# Patient Record
Sex: FEMALE | Race: WHITE | NOT HISPANIC OR LATINO | URBAN - METROPOLITAN AREA
[De-identification: names, ages, dates, MRNs, and addresses within clinical notes are randomized per-mention and may not be internally consistent; named-entity substitution may affect disease eponyms.]

---

## 2017-07-15 ENCOUNTER — EMERGENCY (EMERGENCY)
Facility: HOSPITAL | Age: 67
LOS: 1 days | Discharge: HOME | End: 2017-07-15
Attending: EMERGENCY MEDICINE
Payer: MEDICARE

## 2017-07-15 VITALS
TEMPERATURE: 98 F | HEART RATE: 81 BPM | RESPIRATION RATE: 18 BRPM | DIASTOLIC BLOOD PRESSURE: 76 MMHG | OXYGEN SATURATION: 97 % | SYSTOLIC BLOOD PRESSURE: 129 MMHG

## 2017-07-15 VITALS
OXYGEN SATURATION: 95 % | SYSTOLIC BLOOD PRESSURE: 109 MMHG | TEMPERATURE: 98 F | HEART RATE: 66 BPM | DIASTOLIC BLOOD PRESSURE: 73 MMHG | RESPIRATION RATE: 18 BRPM

## 2017-07-15 DIAGNOSIS — Z90.49 ACQUIRED ABSENCE OF OTHER SPECIFIED PARTS OF DIGESTIVE TRACT: Chronic | ICD-10-CM

## 2017-07-15 LAB
ALBUMIN SERPL ELPH-MCNC: 4 G/DL — SIGNIFICANT CHANGE UP (ref 3.3–5)
ALP SERPL-CCNC: 97 U/L — SIGNIFICANT CHANGE UP (ref 40–120)
ALT FLD-CCNC: 30 U/L RC — SIGNIFICANT CHANGE UP (ref 10–45)
ANION GAP SERPL CALC-SCNC: 19 MMOL/L — HIGH (ref 5–17)
APPEARANCE UR: CLEAR — SIGNIFICANT CHANGE UP
AST SERPL-CCNC: 37 U/L — SIGNIFICANT CHANGE UP (ref 10–40)
BASOPHILS # BLD AUTO: 0 K/UL — SIGNIFICANT CHANGE UP (ref 0–0.2)
BASOPHILS NFR BLD AUTO: 0.3 % — SIGNIFICANT CHANGE UP (ref 0–2)
BILIRUB SERPL-MCNC: 0.5 MG/DL — SIGNIFICANT CHANGE UP (ref 0.2–1.2)
BILIRUB UR-MCNC: NEGATIVE — SIGNIFICANT CHANGE UP
BUN SERPL-MCNC: 19 MG/DL — SIGNIFICANT CHANGE UP (ref 7–23)
CALCIUM SERPL-MCNC: 9.4 MG/DL — SIGNIFICANT CHANGE UP (ref 8.4–10.5)
CHLORIDE SERPL-SCNC: 98 MMOL/L — SIGNIFICANT CHANGE UP (ref 96–108)
CO2 SERPL-SCNC: 19 MMOL/L — LOW (ref 22–31)
COLOR SPEC: SIGNIFICANT CHANGE UP
COMMENT - URINE: SIGNIFICANT CHANGE UP
CREAT SERPL-MCNC: 1.55 MG/DL — HIGH (ref 0.5–1.3)
DIFF PNL FLD: NEGATIVE — SIGNIFICANT CHANGE UP
EOSINOPHIL # BLD AUTO: 0.2 K/UL — SIGNIFICANT CHANGE UP (ref 0–0.5)
EOSINOPHIL NFR BLD AUTO: 1.4 % — SIGNIFICANT CHANGE UP (ref 0–6)
GLUCOSE SERPL-MCNC: 103 MG/DL — HIGH (ref 70–99)
GLUCOSE UR QL: NEGATIVE — SIGNIFICANT CHANGE UP
HCT VFR BLD CALC: 37.7 % — SIGNIFICANT CHANGE UP (ref 34.5–45)
HGB BLD-MCNC: 12.7 G/DL — SIGNIFICANT CHANGE UP (ref 11.5–15.5)
KETONES UR-MCNC: ABNORMAL
LEUKOCYTE ESTERASE UR-ACNC: NEGATIVE — SIGNIFICANT CHANGE UP
LYMPHOCYTES # BLD AUTO: 1.9 K/UL — SIGNIFICANT CHANGE UP (ref 1–3.3)
LYMPHOCYTES # BLD AUTO: 13.5 % — SIGNIFICANT CHANGE UP (ref 13–44)
MAGNESIUM SERPL-MCNC: 2.4 MG/DL — SIGNIFICANT CHANGE UP (ref 1.6–2.6)
MCHC RBC-ENTMCNC: 31.2 PG — SIGNIFICANT CHANGE UP (ref 27–34)
MCHC RBC-ENTMCNC: 33.7 GM/DL — SIGNIFICANT CHANGE UP (ref 32–36)
MCV RBC AUTO: 92.7 FL — SIGNIFICANT CHANGE UP (ref 80–100)
MONOCYTES # BLD AUTO: 1.6 K/UL — HIGH (ref 0–0.9)
MONOCYTES NFR BLD AUTO: 10.8 % — SIGNIFICANT CHANGE UP (ref 2–14)
NEUTROPHILS # BLD AUTO: 10.7 K/UL — HIGH (ref 1.8–7.4)
NEUTROPHILS NFR BLD AUTO: 74 % — SIGNIFICANT CHANGE UP (ref 43–77)
NITRITE UR-MCNC: NEGATIVE — SIGNIFICANT CHANGE UP
PH UR: 6.5 — SIGNIFICANT CHANGE UP (ref 5–8)
PHOSPHATE SERPL-MCNC: 3 MG/DL — SIGNIFICANT CHANGE UP (ref 2.5–4.5)
PLATELET # BLD AUTO: 289 K/UL — SIGNIFICANT CHANGE UP (ref 150–400)
POTASSIUM SERPL-MCNC: 3.3 MMOL/L — LOW (ref 3.5–5.3)
POTASSIUM SERPL-SCNC: 3.3 MMOL/L — LOW (ref 3.5–5.3)
PROT SERPL-MCNC: 7.6 G/DL — SIGNIFICANT CHANGE UP (ref 6–8.3)
PROT UR-MCNC: NEGATIVE — SIGNIFICANT CHANGE UP
RBC # BLD: 4.07 M/UL — SIGNIFICANT CHANGE UP (ref 3.8–5.2)
RBC # FLD: 12.9 % — SIGNIFICANT CHANGE UP (ref 10.3–14.5)
RBC CASTS # UR COMP ASSIST: SIGNIFICANT CHANGE UP /HPF (ref 0–2)
SODIUM SERPL-SCNC: 136 MMOL/L — SIGNIFICANT CHANGE UP (ref 135–145)
SP GR SPEC: 1.01 — SIGNIFICANT CHANGE UP (ref 1.01–1.02)
UROBILINOGEN FLD QL: NEGATIVE — SIGNIFICANT CHANGE UP
WBC # BLD: 14.4 K/UL — HIGH (ref 3.8–10.5)
WBC # FLD AUTO: 14.4 K/UL — HIGH (ref 3.8–10.5)
WBC UR QL: SIGNIFICANT CHANGE UP /HPF (ref 0–5)

## 2017-07-15 PROCEDURE — 70450 CT HEAD/BRAIN W/O DYE: CPT | Mod: 26

## 2017-07-15 PROCEDURE — 73060 X-RAY EXAM OF HUMERUS: CPT | Mod: 26,RT

## 2017-07-15 PROCEDURE — 99284 EMERGENCY DEPT VISIT MOD MDM: CPT | Mod: GC

## 2017-07-15 PROCEDURE — 73502 X-RAY EXAM HIP UNI 2-3 VIEWS: CPT | Mod: 26,LT

## 2017-07-15 PROCEDURE — 73080 X-RAY EXAM OF ELBOW: CPT | Mod: 26,RT

## 2017-07-15 PROCEDURE — 72131 CT LUMBAR SPINE W/O DYE: CPT | Mod: 26

## 2017-07-15 RX ORDER — SODIUM CHLORIDE 9 MG/ML
1000 INJECTION INTRAMUSCULAR; INTRAVENOUS; SUBCUTANEOUS ONCE
Qty: 0 | Refills: 0 | Status: COMPLETED | OUTPATIENT
Start: 2017-07-15 | End: 2017-07-15

## 2017-07-15 RX ORDER — IBUPROFEN 200 MG
600 TABLET ORAL ONCE
Qty: 0 | Refills: 0 | Status: COMPLETED | OUTPATIENT
Start: 2017-07-15 | End: 2017-07-15

## 2017-07-15 RX ORDER — ACETAMINOPHEN 500 MG
975 TABLET ORAL ONCE
Qty: 0 | Refills: 0 | Status: COMPLETED | OUTPATIENT
Start: 2017-07-15 | End: 2017-07-15

## 2017-07-15 RX ADMIN — SODIUM CHLORIDE 1000 MILLILITER(S): 9 INJECTION INTRAMUSCULAR; INTRAVENOUS; SUBCUTANEOUS at 03:47

## 2017-07-15 NOTE — ED PROVIDER NOTE - NS ED ROS FT
CONST: no fevers, no chills  EYES: no pain  ENT: no sore throat   CV: no chest pain  RESP: no shortness of breath  ABD: no abdominal pain   : no dysuria  MSK: right arm pain above elbow  NEURO: no headache or additional neurologic complaints  HEME: no easy bleeding  SKIN:  no rash

## 2017-07-15 NOTE — ED PROVIDER NOTE - PLAN OF CARE
Patient reassessed and reports feeling better and is able to ambulate. Patient ready for D/C to follow up with  PMD. Re-examination shows patient moving all extremitites and A/OX3 and with normal abdominal exam Patient reassessed and reports feeling better and is able to ambulate. Patient ready for D/C to follow up with  PMD. Re-examination shows patient moving all extremitites and A/OX3 and with normal abdominal exam  Sascha PGY3:  You may take 1000mg of tylenol every 6 hours for baseline pain control with respect to the warnings on the label  Follow up with your primary care doctor within 48-72 hours.   You must return for new, worsening or concerning symptoms; specifically including those listed on the attached sheet.

## 2017-07-15 NOTE — ED ADULT NURSE NOTE - OBJECTIVE STATEMENT
pt BIBA s/p mechanical fall at home. pt states, "I was walking to the bathroom when I tripped over an extension cord and hit the wood floors. I hit the right side of my face when I fell." pt denies LOC and pt not on blood thinners.

## 2017-07-15 NOTE — ED ADULT NURSE NOTE - CHPI ED SYMPTOMS NEG
no confusion/no vomiting/no abrasion/no tingling/no loss of consciousness/no deformity/no bleeding/no fever/no numbness

## 2017-07-15 NOTE — ED PROVIDER NOTE - OBJECTIVE STATEMENT
67 year old, history of depression, presenting after fall (mechanical) tripped on a cord, pain is worst on area of humerus proximal to humerus. denies head impact, remembers all of the details. endorses frequent falls over the past few days. decreased strength over the past few days.     meds: paxil, buspar, abilify, clonopin, risperdone    PMD: doesn't remember  Psych: Behr 67 year old, history of depression, presenting after fall (mechanical) tripped on a cord, pain is worst on area of humerus proximal to humerus. denies head impact, remembers all of the details. endorses frequent falls over the past few days. decreased strength over the past few days.     meds: paxil, buspar, abilify, clonopin, risperdone    PMD: doesn't remember  Psych: Behr EPena MD: 66 y/o female with hx of MDD in the ER after acute fall. Patient reports falling at home after tripping on a cord and landing on wooden floor. Patient remembers episode and denies LOC and was able to get up on her own. Also endorses generalized weakness in the past few months with recurrent falls at home. Denies CP, SOB, HA, abd pain, N/V/D, vag bleeding, melena, BRBPR, skin changes, sick contacts or recent trauma or hospitalizations.

## 2017-07-15 NOTE — ED PROVIDER NOTE - ATTENDING CONTRIBUTION TO CARE
Attending MD Martínez:  I personally have seen and examined this patient.  Resident note reviewed and agree on plan of care and except where noted.  See MDM for details.

## 2017-07-15 NOTE — ED PROVIDER NOTE - PHYSICAL EXAMINATION
[facility]: A & O x 3, NAD, HEENT WNL and no facial asymmetry; lungs CTAB, heart with reg rhythm without murmur; lumbar spine with ecchymosis, right elbow tender proximal to elbow (distal humerus) with ranging intact. abdomen soft NTND; extremities with no edema; skin with no rashes, neuro exam non focal with no motor or sensory deficits

## 2017-07-15 NOTE — ED PROVIDER NOTE - CARE PLAN
Principal Discharge DX:	Fall, initial encounter  Instructions for follow-up, activity and diet:	Patient reassessed and reports feeling better and is able to ambulate. Patient ready for D/C to follow up with  PMD. Re-examination shows patient moving all extremitites and A/OX3 and with normal abdominal exam Principal Discharge DX:	Fall, initial encounter  Instructions for follow-up, activity and diet:	Patient reassessed and reports feeling better and is able to ambulate. Patient ready for D/C to follow up with  PMD. Re-examination shows patient moving all extremitites and A/OX3 and with normal abdominal exam  Sascha PGY3:  You may take 1000mg of tylenol every 6 hours for baseline pain control with respect to the warnings on the label  Follow up with your primary care doctor within 48-72 hours.   You must return for new, worsening or concerning symptoms; specifically including those listed on the attached sheet.

## 2017-07-15 NOTE — ED PROVIDER NOTE - MEDICAL DECISION MAKING DETAILS
Mauricio ROMERO: 68 y/o female with psych hx in the ER with acute and recurrent mechanical fall. Patient reports not taking AC and denies LOC. Exam shows mild tenderness of R proximal humerus w/o posterior cervical tenderness, skin lacerations or traumatic head injury. Patient does walk and has no decreased ROM in any shoulder or knee or pelvic joint. Patient is A/OX3 and able to walk w/o difficulty. Consider mechanical fall vs UTI vs Electrolyte abnormality due to dehydration. Also eval for humeral fracture and ICH given mild head trauma. Plan CBC, CMP, UA, xrays, CT head and IVF and reassess.

## 2017-07-15 NOTE — ED PROVIDER NOTE - PROGRESS NOTE DETAILS
Sascha PGY3: ambulated will without assistance. will be discharged with follow up Mauricio ROMERO: Patient reassessed and continues to be A/OX3. Results discussed and patient recommended to hydrate and follow up with PMD.  present during this conversation.

## 2017-07-17 ENCOUNTER — INPATIENT (INPATIENT)
Facility: HOSPITAL | Age: 67
LOS: 3 days | Discharge: HOME | DRG: 552 | End: 2017-07-21
Attending: HOSPITALIST | Admitting: HOSPITALIST
Payer: MEDICARE

## 2017-07-17 VITALS
OXYGEN SATURATION: 94 % | RESPIRATION RATE: 18 BRPM | HEART RATE: 64 BPM | SYSTOLIC BLOOD PRESSURE: 115 MMHG | DIASTOLIC BLOOD PRESSURE: 75 MMHG

## 2017-07-17 DIAGNOSIS — Z90.49 ACQUIRED ABSENCE OF OTHER SPECIFIED PARTS OF DIGESTIVE TRACT: Chronic | ICD-10-CM

## 2017-07-17 DIAGNOSIS — R55 SYNCOPE AND COLLAPSE: ICD-10-CM

## 2017-07-17 LAB
ALBUMIN SERPL ELPH-MCNC: 3.6 G/DL — SIGNIFICANT CHANGE UP (ref 3.3–5)
ALP SERPL-CCNC: 85 U/L — SIGNIFICANT CHANGE UP (ref 40–120)
ALT FLD-CCNC: 35 U/L RC — SIGNIFICANT CHANGE UP (ref 10–45)
ANION GAP SERPL CALC-SCNC: 14 MMOL/L — SIGNIFICANT CHANGE UP (ref 5–17)
APPEARANCE UR: CLEAR — SIGNIFICANT CHANGE UP
APTT BLD: 26.4 SEC — LOW (ref 27.5–37.4)
AST SERPL-CCNC: 49 U/L — HIGH (ref 10–40)
BASOPHILS # BLD AUTO: 0.1 K/UL — SIGNIFICANT CHANGE UP (ref 0–0.2)
BASOPHILS NFR BLD AUTO: 0.6 % — SIGNIFICANT CHANGE UP (ref 0–2)
BILIRUB SERPL-MCNC: 0.3 MG/DL — SIGNIFICANT CHANGE UP (ref 0.2–1.2)
BILIRUB UR-MCNC: NEGATIVE — SIGNIFICANT CHANGE UP
BUN SERPL-MCNC: 14 MG/DL — SIGNIFICANT CHANGE UP (ref 7–23)
CALCIUM SERPL-MCNC: 9.1 MG/DL — SIGNIFICANT CHANGE UP (ref 8.4–10.5)
CHLORIDE SERPL-SCNC: 105 MMOL/L — SIGNIFICANT CHANGE UP (ref 96–108)
CK MB BLD-MCNC: 1.3 % — SIGNIFICANT CHANGE UP (ref 0–3.5)
CK MB CFR SERPL CALC: 12.6 NG/ML — HIGH (ref 0–3.8)
CK SERPL-CCNC: 940 U/L — HIGH (ref 25–170)
CO2 SERPL-SCNC: 22 MMOL/L — SIGNIFICANT CHANGE UP (ref 22–31)
COLOR SPEC: YELLOW — SIGNIFICANT CHANGE UP
CREAT SERPL-MCNC: 1.02 MG/DL — SIGNIFICANT CHANGE UP (ref 0.5–1.3)
DIFF PNL FLD: NEGATIVE — SIGNIFICANT CHANGE UP
EOSINOPHIL # BLD AUTO: 0.2 K/UL — SIGNIFICANT CHANGE UP (ref 0–0.5)
EOSINOPHIL NFR BLD AUTO: 1.4 % — SIGNIFICANT CHANGE UP (ref 0–6)
GAS PNL BLDV: SIGNIFICANT CHANGE UP
GLUCOSE SERPL-MCNC: 105 MG/DL — HIGH (ref 70–99)
GLUCOSE UR QL: NEGATIVE — SIGNIFICANT CHANGE UP
HCT VFR BLD CALC: 34.3 % — LOW (ref 34.5–45)
HGB BLD-MCNC: 11.3 G/DL — LOW (ref 11.5–15.5)
INR BLD: 1.01 RATIO — SIGNIFICANT CHANGE UP (ref 0.88–1.16)
KETONES UR-MCNC: NEGATIVE — SIGNIFICANT CHANGE UP
LEUKOCYTE ESTERASE UR-ACNC: ABNORMAL
LYMPHOCYTES # BLD AUTO: 17.8 % — SIGNIFICANT CHANGE UP (ref 13–44)
LYMPHOCYTES # BLD AUTO: 2 K/UL — SIGNIFICANT CHANGE UP (ref 1–3.3)
MCHC RBC-ENTMCNC: 30.4 PG — SIGNIFICANT CHANGE UP (ref 27–34)
MCHC RBC-ENTMCNC: 33 GM/DL — SIGNIFICANT CHANGE UP (ref 32–36)
MCV RBC AUTO: 92.2 FL — SIGNIFICANT CHANGE UP (ref 80–100)
MONOCYTES # BLD AUTO: 1.2 K/UL — HIGH (ref 0–0.9)
MONOCYTES NFR BLD AUTO: 10.5 % — SIGNIFICANT CHANGE UP (ref 2–14)
NEUTROPHILS # BLD AUTO: 8 K/UL — HIGH (ref 1.8–7.4)
NEUTROPHILS NFR BLD AUTO: 69.7 % — SIGNIFICANT CHANGE UP (ref 43–77)
NITRITE UR-MCNC: NEGATIVE — SIGNIFICANT CHANGE UP
PH UR: 6 — SIGNIFICANT CHANGE UP (ref 5–8)
PLATELET # BLD AUTO: 322 K/UL — SIGNIFICANT CHANGE UP (ref 150–400)
POTASSIUM SERPL-MCNC: 3.2 MMOL/L — LOW (ref 3.5–5.3)
POTASSIUM SERPL-SCNC: 3.2 MMOL/L — LOW (ref 3.5–5.3)
PROT SERPL-MCNC: 6.9 G/DL — SIGNIFICANT CHANGE UP (ref 6–8.3)
PROT UR-MCNC: SIGNIFICANT CHANGE UP
PROTHROM AB SERPL-ACNC: 11 SEC — SIGNIFICANT CHANGE UP (ref 9.8–12.7)
RBC # BLD: 3.72 M/UL — LOW (ref 3.8–5.2)
RBC # FLD: 12.9 % — SIGNIFICANT CHANGE UP (ref 10.3–14.5)
SODIUM SERPL-SCNC: 141 MMOL/L — SIGNIFICANT CHANGE UP (ref 135–145)
SP GR SPEC: 1.01 — SIGNIFICANT CHANGE UP (ref 1.01–1.02)
TROPONIN T SERPL-MCNC: <0.01 NG/ML — SIGNIFICANT CHANGE UP (ref 0–0.06)
UROBILINOGEN FLD QL: NEGATIVE — SIGNIFICANT CHANGE UP
WBC # BLD: 11.5 K/UL — HIGH (ref 3.8–10.5)
WBC # FLD AUTO: 11.5 K/UL — HIGH (ref 3.8–10.5)

## 2017-07-17 PROCEDURE — 93010 ELECTROCARDIOGRAM REPORT: CPT

## 2017-07-17 PROCEDURE — 70486 CT MAXILLOFACIAL W/O DYE: CPT | Mod: 26

## 2017-07-17 PROCEDURE — 71250 CT THORAX DX C-: CPT | Mod: 26

## 2017-07-17 PROCEDURE — 71010: CPT | Mod: 26

## 2017-07-17 PROCEDURE — 99285 EMERGENCY DEPT VISIT HI MDM: CPT | Mod: 25

## 2017-07-17 PROCEDURE — 72131 CT LUMBAR SPINE W/O DYE: CPT | Mod: 26

## 2017-07-17 PROCEDURE — 72170 X-RAY EXAM OF PELVIS: CPT | Mod: 26

## 2017-07-17 PROCEDURE — 74176 CT ABD & PELVIS W/O CONTRAST: CPT | Mod: 26

## 2017-07-17 PROCEDURE — 70498 CT ANGIOGRAPHY NECK: CPT | Mod: 26

## 2017-07-17 PROCEDURE — 70450 CT HEAD/BRAIN W/O DYE: CPT | Mod: 26

## 2017-07-17 PROCEDURE — 72125 CT NECK SPINE W/O DYE: CPT | Mod: 26

## 2017-07-17 RX ORDER — SODIUM CHLORIDE 9 MG/ML
500 INJECTION INTRAMUSCULAR; INTRAVENOUS; SUBCUTANEOUS ONCE
Qty: 0 | Refills: 0 | Status: COMPLETED | OUTPATIENT
Start: 2017-07-17 | End: 2017-07-17

## 2017-07-17 RX ORDER — MORPHINE SULFATE 50 MG/1
4 CAPSULE, EXTENDED RELEASE ORAL ONCE
Qty: 0 | Refills: 0 | Status: DISCONTINUED | OUTPATIENT
Start: 2017-07-17 | End: 2017-07-17

## 2017-07-17 RX ORDER — ACETAMINOPHEN 500 MG
1000 TABLET ORAL ONCE
Qty: 0 | Refills: 0 | Status: COMPLETED | OUTPATIENT
Start: 2017-07-17 | End: 2017-07-17

## 2017-07-17 RX ORDER — POTASSIUM CHLORIDE 20 MEQ
40 PACKET (EA) ORAL ONCE
Qty: 0 | Refills: 0 | Status: COMPLETED | OUTPATIENT
Start: 2017-07-17 | End: 2017-07-17

## 2017-07-17 RX ORDER — POTASSIUM CHLORIDE 20 MEQ
10 PACKET (EA) ORAL ONCE
Qty: 0 | Refills: 0 | Status: COMPLETED | OUTPATIENT
Start: 2017-07-17 | End: 2017-07-17

## 2017-07-17 RX ADMIN — MORPHINE SULFATE 4 MILLIGRAM(S): 50 CAPSULE, EXTENDED RELEASE ORAL at 23:49

## 2017-07-17 RX ADMIN — SODIUM CHLORIDE 500 MILLILITER(S): 9 INJECTION INTRAMUSCULAR; INTRAVENOUS; SUBCUTANEOUS at 06:23

## 2017-07-17 RX ADMIN — Medication 400 MILLIGRAM(S): at 07:26

## 2017-07-17 RX ADMIN — Medication 1000 MILLIGRAM(S): at 20:42

## 2017-07-17 RX ADMIN — Medication 40 MILLIEQUIVALENT(S): at 11:13

## 2017-07-17 NOTE — ED ADULT NURSE NOTE - CHPI ED SYMPTOMS NEG
no fever/no vomiting/no loss of consciousness/no numbness/no tingling/no weakness/no deformity/no confusion

## 2017-07-17 NOTE — ED PROVIDER NOTE - PHYSICAL EXAMINATION
PE: CONSTITUTIONAL: Nontoxic, in no apparent distress. ENMT: Airway patent, nasal mucosa clear, mouth with normal mucosa. HEAD: Right maxilla and right temple with ecchymosis and TTP EYES: PERRL, EOMI bilaterally CARDIAC: RRR, no m/r/g, no pedal edema RESPIRATORY: CTA bilaterally, no adventitious sounds GI: Abdomen non-distended, non-tender MSK: Spine appears normal, range of motion is not limited, moderate TTP of left lumbar back, left thoracic region NEURO: CNII-XII grossly intact, 4/5 strength LLE / LUE (chronic), 5/5 strength RUE/RLE, full sensation all extremities, gait not tested SKIN: Skin tone normal in color, warm and dry. +ecchymosis of right face and right temple

## 2017-07-17 NOTE — ED PROCEDURE NOTE - PROCEDURE ADDITIONAL DETAILS
Peripheral IV access in the Emergency Department obtained under dynamic ultrasound guidance with dark nonpulsatile blood return.  Catheter was flushed afterwards without any resistance or resulting extravasation.  IV catheter confirmed in compressible vein after insertion.   20g catheter placed in vein in antecubital fossa of right upper extremity.

## 2017-07-17 NOTE — ED PROVIDER NOTE - MEDICAL DECISION MAKING DETAILS
attending Karin: 67yF h/o stroke, chronic L sided weakness with 2nd visit to ED this week for fall. Tonight with fall while walking up the stairs, syncopal in nature. Denies LOC. denies prolonged downtime. No AC. Cervical collar placed by EMS. On examination, altert and oriented. +cspine tenderness. Ecchymosis over R periorbital area (from prior fall), abdomen soft, mild Lateral thoracic tenderness bilaterally. Will obtain panscan, keep in cervical collar, analgesia, bloodwork, urinalysis, tele monitoring and admission.

## 2017-07-17 NOTE — ED PROVIDER NOTE - OBJECTIVE STATEMENT
67y Female PMH stroke with chronic left sided weakness, hx of depression complaining of fall. Had a fall three days ago, had a right eye ecchymosis, was discharged home. Now had a mechanical fall today while going up the stairs, hit the right side of head. Happened at home. No loss of consciousness.    meds: paxil, buspar, abilify, clonopin, risperdone    PCP: Dr. Bhupendra Mandujano  Psych: Dr. Behr 67y Female PMH stroke with chronic left sided weakness, hx of depression complaining of fall. Had a fall three days ago, had a right eye ecchymosis, was discharged home. Now had a fall today while going up the stairs went up 5 stairs, believes she was dizzy prior to falling, hit the right side of head. Happened at home. No loss of consciousness.    meds: paxil, buspar, abilify, clonopin, risperdone    PCP: Dr. Bhupendra Mandujano  Psych: Dr. Behr 67y Female PMH stroke with chronic left sided weakness, hx of depression complaining of fall. Had a fall three days ago, had a right eye ecchymosis, was discharged home. Now had a fall today while going up the stairs went up 5 stairs, believes she was dizzy prior to falling, hit the right side of head. Happened at home. No loss of consciousness. Mainly feeling pain in the left thoracic and left back.    meds: paxil, buspar, abilify, clonopin, risperdone    PCP: Dr. Bhupendra Mandujano  Psych: Dr. Behr

## 2017-07-17 NOTE — ED PROVIDER NOTE - PROGRESS NOTE DETAILS
patient removed her own C-collar before CT was performed. [place] J collar placed. Karin ROMERO I have received sign out on this patient, briefly: 66yo F, h/o CVA w/ left weakness, p/w fall and found to have cervical spinal c2 trans process fx; original plan to obtain CTA to r/o vascular injury; however, no initial iv access (line blew); rpt acces by RN insufficient; line placed via US and now underwent CT; no vascular injruy.  Patient remains stable, appears in NAD; will admit to med / tele for unclear fall and/or syncope. (cleared from trauma and neurosurgery) -Rex

## 2017-07-17 NOTE — CONSULT NOTE ADULT - SUBJECTIVE AND OBJECTIVE BOX
p (1480)     HPI: 67F with history of right sided stroke with residual left sided weakness s/p mechanical fall today down stairs, sustained C2 transverse foramen fracture. Patient has neck pain but no headache, numbness, tingling. CT scan shows transverse foramen fracture with small comminuted fragment within foramen. Evaluated by trauma.    PAST MEDICAL HISTORY   Hypertension  Depression  Anxiety    PAST SURGICAL HISTORY   History of appendectomy      MEDICATIONS:  Antibiotics:    Neuro:    Anticoagulation:    Other:      FAMILY HISTORY:      REVIEW OF SYSTEMS:  Check here if all are normal other than Neurological [x]  General:  Eyes:  ENT:  Cardiac:  Respiratory:  GI:  Musculoskeletal:   Skin:  Neurologic:   Psychiatric:     PHYSICAL EXAMINATION:   T(C): 36.7 (17 @ 07:45), Max: 36.7 (17 @ 07:45)  HR: 66 (17 @ 11:28) (64 - 84)  BP: 117/82 (17 @ 11:28) (115/75 - 126/71)  RR: 18 (17 @ 11:28) (17 - 18)  SpO2: 99% (17 @ 11:28) (94% - 100%)  Wt(kg): --    General Examination:     Neurologic Examination:             Higher functions                 Normal [x]               Abnormal:      Cranial Nerves (ii-xii)           Normal [x]              Abnormal:     Motor Exam                       Normal [x]              Abnormal:                               Sensory Exam                   Normal [x]              Abnormal:    Reflexes                            Normal [x]              Abnormal:     Coordination                      Normal []              Abnormal:  trace left dysmetria (baseline)    Other:     LABS:                        11.3   11.5  )-----------( 322      ( 2017 06:25 )             34.3         141  |  105  |  14  ----------------------------<  105<H>  3.2<L>   |  22  |  1.02    Ca    9.1      2017 06:25    TPro  6.9  /  Alb  3.6  /  TBili  0.3  /  DBili  x   /  AST  49<H>  /  ALT  35  /  AlkPhos  85      PT/INR - ( 2017 06:25 )   PT: 11.0 sec;   INR: 1.01 ratio         PTT - ( 2017 06:25 )  PTT:26.4 sec  Urinalysis Basic - ( 2017 06:03 )    Color: Yellow / Appearance: Clear / S.013 / pH: x  Gluc: x / Ketone: Negative  / Bili: Negative / Urobili: Negative   Blood: x / Protein: Trace / Nitrite: Negative   Leuk Esterase: Trace / RBC: 0-2 /HPF / WBC 6-10 /HPF   Sq Epi: x / Non Sq Epi: OCC /HPF / Bacteria: x

## 2017-07-17 NOTE — ED ADULT NURSE REASSESSMENT NOTE - NS ED NURSE REASSESS COMMENT FT1
Recvd pt awake, alert and responsive to all stimuli.  no sob or respiratory distress noted.  pt resting in bed with c-collar in place for C2 fx awaiting md reeval and dispo.  will continue to monitor.

## 2017-07-17 NOTE — CONSULT NOTE ADULT - ATTENDING COMMENTS
seen and examined as trauma consult    67F fell down 3 stairs while carrying multiple items this morning. no LOC.  Also tripped and fell from standing 3 days ago.    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    A/P-    C2 left transverse process fracture  -needs CTA neck to r/o left vertebral artery BCVI which could increase her risk for recurrent CVA  -keep c-collar in place and consult spine surgery for follow-up    I explained to her that recurrent falls significantly increases her risk of mortality. She needs to be referred to physical therapy, undergo a home safety assessment and f/u with a geriatrician to decrease her risk of another fall.      Trauma will f/u results of CTA and provide further recommendations. seen and examined as trauma consult 7/17/2017 @ 1110.    67F fell down 3 stairs while carrying multiple items this morning. no LOC.  Also tripped and fell from standing 3 days ago.    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    A/P-    C2 left transverse process fracture  -needs CTA neck to r/o left vertebral artery BCVI which could increase her risk for recurrent CVA  -keep c-collar in place and consult spine surgery for follow-up    I explained to her that recurrent falls significantly increases her risk of mortality. She needs to be referred to physical therapy, undergo a home safety assessment and f/u with a geriatrician to decrease her risk of another fall.      Trauma will f/u results of CTA and provide further recommendations.

## 2017-07-17 NOTE — CONSULT NOTE ADULT - ASSESSMENT
67F s/p fall with C2 left sided transverse foramen fracture. Recommned CTA neck. C-collar for 4 weeks and follow up outpatient if CTA negative. Neurochecks q4h.

## 2017-07-17 NOTE — CONSULT NOTE ADULT - ASSESSMENT
67 year old female s/p fall with C2 L transverse fx  - Appreciate spine recommendations  - Follow up CTA of neck  - No surgical interventions  - Will need outpatient PT    SABINO Merida, PGY 2 0044

## 2017-07-17 NOTE — CONSULT NOTE ADULT - SUBJECTIVE AND OBJECTIVE BOX
This is a 67 year old female who presents after falling and striking her R temple while on the stairs. Denies LOC, was alert and oriented entire time (per pt and ). Pt notes she was trying to carry multiple bags up stairs but did not have a good  on banister because she has residual L weakness after having a previous stroke.  Denies vision changes, does not think she was dizzy before falling, denies headaches, numbness or tingling.     Pt previously fell three days ago after tripping on cord near her bed. Sustained ecchymosis around R eye from this episode.       Secondary survey  Gen: NAD, AOx3  HEENT: C spine tenderness, CN intact  CV: s1, s2, RRR  Pulm: CTA B/L  Chest: No TTP  Abd: Soft, non- distended, Non tender to palpation, no rebound, no guarding  Groin: Normal appearing  Ext: palp radial b/l UE, b/l DP palp in Lower Extrem Motor 4/5 L, 5/5 R, Sensation 5/5 b/l  Back: no TTP, no palpable runoff/stepoff/deformity    PMH  Hypertension  Depression  Anxiety    PSH  History of appendectomy    MEDS    Allergies    No Known Allergies    Intolerances        Social    Labs:                        11.3   11.5  )-----------( 322      ( 2017 06:25 )             34.3     07-17    141  |  105  |  14  ----------------------------<  105<H>  3.2<L>   |  22  |  1.02    Ca    9.1      2017 06:25    TPro  6.9  /  Alb  3.6  /  TBili  0.3  /  DBili  x   /  AST  49<H>  /  ALT  35  /  AlkPhos  85  07-17    PT/INR - ( 2017 06:25 )   PT: 11.0 sec;   INR: 1.01 ratio         PTT - ( 2017 06:25 )  PTT:26.4 sec  Urinalysis Basic - ( 2017 06:03 )    Color: Yellow / Appearance: Clear / S.013 / pH: x  Gluc: x / Ketone: Negative  / Bili: Negative / Urobili: Negative   Blood: x / Protein: Trace / Nitrite: Negative   Leuk Esterase: Trace / RBC: 0-2 /HPF / WBC 6-10 /HPF   Sq Epi: x / Non Sq Epi: OCC /HPF / Bacteria: x    Imaging    CT CERVICAL SPINE    IMPRESSION:  C2 left transverse process fracture extending through the   transverse foramen with a mildly depressed superior fracture fragment   impinging upon the transverse foramen. CT angiography or MR angiography   is advised to exclude injury to the vertebral artery.    Small ossific fragment within the C2-C3 left neural foramen likely   arising from the C2 inferior articulating facet.     CT LUMBAR SPINE  FINDINGS:    VERTEBRAL BODIES AND DISCS:  The vertebral bodies are normal in height.   Discogenic endplate changes are seen at the L1-L2 through L3-L4 levels   with marginal osteophyte formation. No fracture is seen.  ALIGNMENT:  There is scoliosis of the lumbar spine convex to the left   with the apex at the L2 level.  L1-L2 LEVEL:  No significant disc bulge or focal disc herniation.  L2-L3 LEVEL:  No significant disc bulge or focal disc herniation.  L3-L4 LEVEL:  No significant disc bulge or focal disc herniation. Mild   facet arthrosis  L4-L5 LEVEL:  No significant disc bulge or focal disc herniation.  L5-S1 LEVEL:  No significant disc bulge or focal disc herniation. Mild   facet arthrosis.  SPINAL CANAL:  No other soft tissue defect identified.   MISCELLANEOUS:  None.    IMPRESSION:  No fracture or subluxation.    Multilevel spondylosis.    Scoliosis convex to the left.      CT CHEST ABDOMEN PELVIS  IMPRESSION: No thoracic, abdominal, or pelvic sequela of trauma. This is a 67 year old female who presents after falling and striking her R temple while on the stairs. Denies LOC, was alert and oriented entire time (per pt and ). Pt notes she was trying to carry multiple bags up stairs but did not have a good  on banister because she has residual L weakness after having a previous stroke.  Denies vision changes, does not think she was dizzy before falling, denies headaches, numbness or tingling.     Pt previously fell three days ago after tripping on cord near her bed. Sustained ecchymosis around R eye from this episode.     The patient denies fever, chills; chest pain, SOB, palpitation; dizziness, weakness; nausea, vomiting; diarrhea, constipation; abdominal pain; bladder and bowel problems.    Secondary survey  Gen: NAD, AOx3  HEENT: C spine tenderness, CN intact  CV: s1, s2, RRR  Pulm: CTA B/L  Chest: No TTP  Abd: Soft, non- distended, Non tender to palpation, no rebound, no guarding  Groin: Normal appearing  Ext: palp radial b/l UE, b/l DP palp in Lower Extrem Motor 4/5 L, 5/5 R, Sensation 5/5 b/l  Back: no TTP, no palpable runoff/stepoff/deformity    PMH  Hypertension  Depression  Anxiety    PSH  History of appendectomy    MEDS    Allergies    No Known Allergies    Intolerances        Social    Labs:                        11.3   11.5  )-----------( 322      ( 2017 06:25 )             34.3     07-17    141  |  105  |  14  ----------------------------<  105<H>  3.2<L>   |  22  |  1.02    Ca    9.1      2017 06:25    TPro  6.9  /  Alb  3.6  /  TBili  0.3  /  DBili  x   /  AST  49<H>  /  ALT  35  /  AlkPhos  85  07-    PT/INR - ( 2017 06:25 )   PT: 11.0 sec;   INR: 1.01 ratio         PTT - ( 2017 06:25 )  PTT:26.4 sec  Urinalysis Basic - ( 2017 06:03 )    Color: Yellow / Appearance: Clear / S.013 / pH: x  Gluc: x / Ketone: Negative  / Bili: Negative / Urobili: Negative   Blood: x / Protein: Trace / Nitrite: Negative   Leuk Esterase: Trace / RBC: 0-2 /HPF / WBC 6-10 /HPF   Sq Epi: x / Non Sq Epi: OCC /HPF / Bacteria: x    Imaging    CT CERVICAL SPINE    IMPRESSION:  C2 left transverse process fracture extending through the   transverse foramen with a mildly depressed superior fracture fragment   impinging upon the transverse foramen. CT angiography or MR angiography   is advised to exclude injury to the vertebral artery.    Small ossific fragment within the C2-C3 left neural foramen likely   arising from the C2 inferior articulating facet.     CT LUMBAR SPINE  FINDINGS:    VERTEBRAL BODIES AND DISCS:  The vertebral bodies are normal in height.   Discogenic endplate changes are seen at the L1-L2 through L3-L4 levels   with marginal osteophyte formation. No fracture is seen.  ALIGNMENT:  There is scoliosis of the lumbar spine convex to the left   with the apex at the L2 level.  L1-L2 LEVEL:  No significant disc bulge or focal disc herniation.  L2-L3 LEVEL:  No significant disc bulge or focal disc herniation.  L3-L4 LEVEL:  No significant disc bulge or focal disc herniation. Mild   facet arthrosis  L4-L5 LEVEL:  No significant disc bulge or focal disc herniation.  L5-S1 LEVEL:  No significant disc bulge or focal disc herniation. Mild   facet arthrosis.  SPINAL CANAL:  No other soft tissue defect identified.   MISCELLANEOUS:  None.    IMPRESSION:  No fracture or subluxation.    Multilevel spondylosis.    Scoliosis convex to the left.      CT CHEST ABDOMEN PELVIS  IMPRESSION: No thoracic, abdominal, or pelvic sequela of trauma.

## 2017-07-17 NOTE — ED ADULT NURSE REASSESSMENT NOTE - NS ED NURSE REASSESS COMMENT FT1
Pt removed own C-spine collar states 'it was so uncomfortable'. Pt states that she has neck pain after removing collar. 2 RNs present collar replaced with a [place] J, pt reports that it's more comfortable.

## 2017-07-17 NOTE — ED ADULT NURSE NOTE - OBJECTIVE STATEMENT
67 year old female brought to ED via EMS complaining of a fall. As per patient she was walking up the stairs and felt dizzy and fell down 4/5 steps. Pt arrived with c-spine in place. Pt has residual left sided weakness from a previous stroke. As per patient she was seen at the hospital for a fall a few days ago. Pt is A&O x 4, VSS, afebrile. IV placed, labs drawn, bed in low position, pt placed on cardiac monitoring. Pt denies LOC. pt has right sided orbital pain, bruising and tenderness, pt is complaining of left sided back/rib pain.  at bedside.

## 2017-07-18 ENCOUNTER — TRANSCRIPTION ENCOUNTER (OUTPATIENT)
Age: 67
End: 2017-07-18

## 2017-07-18 DIAGNOSIS — R79.89 OTHER SPECIFIED ABNORMAL FINDINGS OF BLOOD CHEMISTRY: ICD-10-CM

## 2017-07-18 DIAGNOSIS — F32.9 MAJOR DEPRESSIVE DISORDER, SINGLE EPISODE, UNSPECIFIED: ICD-10-CM

## 2017-07-18 DIAGNOSIS — Z29.9 ENCOUNTER FOR PROPHYLACTIC MEASURES, UNSPECIFIED: ICD-10-CM

## 2017-07-18 DIAGNOSIS — T79.6XXA TRAUMATIC ISCHEMIA OF MUSCLE, INITIAL ENCOUNTER: ICD-10-CM

## 2017-07-18 DIAGNOSIS — E87.6 HYPOKALEMIA: ICD-10-CM

## 2017-07-18 DIAGNOSIS — I69.359 HEMIPLEGIA AND HEMIPARESIS FOLLOWING CEREBRAL INFARCTION AFFECTING UNSPECIFIED SIDE: ICD-10-CM

## 2017-07-18 DIAGNOSIS — R63.8 OTHER SYMPTOMS AND SIGNS CONCERNING FOOD AND FLUID INTAKE: ICD-10-CM

## 2017-07-18 DIAGNOSIS — I10 ESSENTIAL (PRIMARY) HYPERTENSION: ICD-10-CM
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
## 2017-07-18 DIAGNOSIS — S12.101A UNSPECIFIED NONDISPLACED FRACTURE OF SECOND CERVICAL VERTEBRA, INITIAL ENCOUNTER FOR CLOSED FRACTURE: ICD-10-CM

## 2017-07-18 LAB
ANION GAP SERPL CALC-SCNC: 19 MMOL/L — HIGH (ref 5–17)
BUN SERPL-MCNC: 12 MG/DL — SIGNIFICANT CHANGE UP (ref 7–23)
CALCIUM SERPL-MCNC: 8.6 MG/DL — SIGNIFICANT CHANGE UP (ref 8.4–10.5)
CHLORIDE SERPL-SCNC: 102 MMOL/L — SIGNIFICANT CHANGE UP (ref 96–108)
CK SERPL-CCNC: 547 U/L — HIGH (ref 25–170)
CO2 SERPL-SCNC: 16 MMOL/L — LOW (ref 22–31)
CREAT SERPL-MCNC: 0.91 MG/DL — SIGNIFICANT CHANGE UP (ref 0.5–1.3)
GLUCOSE SERPL-MCNC: 77 MG/DL — SIGNIFICANT CHANGE UP (ref 70–99)
HCT VFR BLD CALC: 32.7 % — LOW (ref 34.5–45)
HGB BLD-MCNC: 10.6 G/DL — LOW (ref 11.5–15.5)
MAGNESIUM SERPL-MCNC: 2.3 MG/DL — SIGNIFICANT CHANGE UP (ref 1.6–2.6)
MCHC RBC-ENTMCNC: 28.6 PG — SIGNIFICANT CHANGE UP (ref 27–34)
MCHC RBC-ENTMCNC: 32.4 GM/DL — SIGNIFICANT CHANGE UP (ref 32–36)
MCV RBC AUTO: 88.4 FL — SIGNIFICANT CHANGE UP (ref 80–100)
PHOSPHATE SERPL-MCNC: 3.1 MG/DL — SIGNIFICANT CHANGE UP (ref 2.5–4.5)
PLATELET # BLD AUTO: 401 K/UL — HIGH (ref 150–400)
POTASSIUM SERPL-MCNC: 3.8 MMOL/L — SIGNIFICANT CHANGE UP (ref 3.5–5.3)
POTASSIUM SERPL-SCNC: 3.8 MMOL/L — SIGNIFICANT CHANGE UP (ref 3.5–5.3)
RBC # BLD: 3.7 M/UL — LOW (ref 3.8–5.2)
RBC # FLD: 15.5 % — HIGH (ref 10.3–14.5)
SODIUM SERPL-SCNC: 137 MMOL/L — SIGNIFICANT CHANGE UP (ref 135–145)
TROPONIN T SERPL-MCNC: <0.01 NG/ML — SIGNIFICANT CHANGE UP (ref 0–0.06)
WBC # BLD: 8.25 K/UL — SIGNIFICANT CHANGE UP (ref 3.8–10.5)
WBC # FLD AUTO: 8.25 K/UL — SIGNIFICANT CHANGE UP (ref 3.8–10.5)

## 2017-07-18 PROCEDURE — 70551 MRI BRAIN STEM W/O DYE: CPT | Mod: 26

## 2017-07-18 PROCEDURE — 99223 1ST HOSP IP/OBS HIGH 75: CPT | Mod: AI,GC

## 2017-07-18 PROCEDURE — 93010 ELECTROCARDIOGRAM REPORT: CPT

## 2017-07-18 RX ORDER — ACETAMINOPHEN 500 MG
650 TABLET ORAL EVERY 6 HOURS
Qty: 0 | Refills: 0 | Status: DISCONTINUED | OUTPATIENT
Start: 2017-07-18 | End: 2017-07-21

## 2017-07-18 RX ORDER — ATORVASTATIN CALCIUM 80 MG/1
20 TABLET, FILM COATED ORAL AT BEDTIME
Qty: 0 | Refills: 0 | Status: DISCONTINUED | OUTPATIENT
Start: 2017-07-18 | End: 2017-07-21

## 2017-07-18 RX ORDER — ARIPIPRAZOLE 15 MG/1
1 TABLET ORAL
Qty: 0 | Refills: 0 | COMMUNITY

## 2017-07-18 RX ORDER — LISINOPRIL 2.5 MG/1
20 TABLET ORAL DAILY
Qty: 0 | Refills: 0 | Status: DISCONTINUED | OUTPATIENT
Start: 2017-07-18 | End: 2017-07-18

## 2017-07-18 RX ORDER — DOXEPIN HCL 100 MG
2 CAPSULE ORAL
Qty: 0 | Refills: 0 | COMMUNITY

## 2017-07-18 RX ORDER — ATORVASTATIN CALCIUM 80 MG/1
20 TABLET, FILM COATED ORAL AT BEDTIME
Qty: 0 | Refills: 0 | Status: DISCONTINUED | OUTPATIENT
Start: 2017-07-18 | End: 2017-07-18

## 2017-07-18 RX ORDER — IBANDRONATE SODIUM 150 MG/1
1 TABLET ORAL
Qty: 0 | Refills: 0 | COMMUNITY

## 2017-07-18 RX ORDER — ASPIRIN/CALCIUM CARB/MAGNESIUM 324 MG
81 TABLET ORAL DAILY
Qty: 0 | Refills: 0 | Status: DISCONTINUED | OUTPATIENT
Start: 2017-07-18 | End: 2017-07-21

## 2017-07-18 RX ORDER — OXYCODONE HYDROCHLORIDE 5 MG/1
10 TABLET ORAL EVERY 6 HOURS
Qty: 0 | Refills: 0 | Status: DISCONTINUED | OUTPATIENT
Start: 2017-07-18 | End: 2017-07-21

## 2017-07-18 RX ORDER — ATORVASTATIN CALCIUM 80 MG/1
1 TABLET, FILM COATED ORAL
Qty: 0 | Refills: 0 | COMMUNITY

## 2017-07-18 RX ORDER — OXYCODONE HYDROCHLORIDE 5 MG/1
5 TABLET ORAL EVERY 4 HOURS
Qty: 0 | Refills: 0 | Status: DISCONTINUED | OUTPATIENT
Start: 2017-07-18 | End: 2017-07-21

## 2017-07-18 RX ORDER — ARIPIPRAZOLE 15 MG/1
2 TABLET ORAL DAILY
Qty: 0 | Refills: 0 | Status: DISCONTINUED | OUTPATIENT
Start: 2017-07-18 | End: 2017-07-21

## 2017-07-18 RX ORDER — DOXEPIN HCL 100 MG
150 CAPSULE ORAL DAILY
Qty: 0 | Refills: 0 | Status: DISCONTINUED | OUTPATIENT
Start: 2017-07-18 | End: 2017-07-21

## 2017-07-18 RX ORDER — ENOXAPARIN SODIUM 100 MG/ML
40 INJECTION SUBCUTANEOUS DAILY
Qty: 0 | Refills: 0 | Status: DISCONTINUED | OUTPATIENT
Start: 2017-07-18 | End: 2017-07-21

## 2017-07-18 RX ADMIN — ARIPIPRAZOLE 2 MILLIGRAM(S): 15 TABLET ORAL at 11:10

## 2017-07-18 RX ADMIN — LISINOPRIL 20 MILLIGRAM(S): 2.5 TABLET ORAL at 04:57

## 2017-07-18 RX ADMIN — Medication 81 MILLIGRAM(S): at 14:36

## 2017-07-18 RX ADMIN — OXYCODONE HYDROCHLORIDE 5 MILLIGRAM(S): 5 TABLET ORAL at 02:05

## 2017-07-18 RX ADMIN — Medication 30 MILLIGRAM(S): at 17:54

## 2017-07-18 RX ADMIN — OXYCODONE HYDROCHLORIDE 5 MILLIGRAM(S): 5 TABLET ORAL at 23:30

## 2017-07-18 RX ADMIN — OXYCODONE HYDROCHLORIDE 5 MILLIGRAM(S): 5 TABLET ORAL at 11:30

## 2017-07-18 RX ADMIN — OXYCODONE HYDROCHLORIDE 5 MILLIGRAM(S): 5 TABLET ORAL at 02:45

## 2017-07-18 RX ADMIN — ATORVASTATIN CALCIUM 20 MILLIGRAM(S): 80 TABLET, FILM COATED ORAL at 22:37

## 2017-07-18 RX ADMIN — Medication 30 MILLIGRAM(S): at 05:00

## 2017-07-18 RX ADMIN — ENOXAPARIN SODIUM 40 MILLIGRAM(S): 100 INJECTION SUBCUTANEOUS at 11:21

## 2017-07-18 RX ADMIN — Medication 30 MILLIGRAM(S): at 11:10

## 2017-07-18 RX ADMIN — OXYCODONE HYDROCHLORIDE 5 MILLIGRAM(S): 5 TABLET ORAL at 12:30

## 2017-07-18 RX ADMIN — Medication 150 MILLIGRAM(S): at 11:10

## 2017-07-18 RX ADMIN — OXYCODONE HYDROCHLORIDE 5 MILLIGRAM(S): 5 TABLET ORAL at 22:37

## 2017-07-18 NOTE — DISCHARGE NOTE ADULT - PLAN OF CARE
C spine recovery Maintain healthy blood pressure Control symptoms control symptoms Maintain healthy blood cholesterol level You had a fall that resulted in a closed fracture of your C2 vertebra. You should continue to wear your C-Collar for a total of 4 weeks. You should continue to take pain medication as prescribed. Please continue to follow up with recommendations. Please see Dr. Mackenzie from neurosurgery in 4 weeks for a follow up appointment.  If you notice increased weakness, numbness or tingling, headache, nausea, or vomiting, please return to the emergency room. Please continue to take your blood pressure medication as prescribed. Please follow up with your primary care physician. Please continue to take your home psychiatric medications. Please continue to take your statin and follow up with your primary care physician. normalize potassium You were found to have low potassium. This resolved with K supplementation Resolve Rhabdo You had some rhabdomyolysis following your fall. This resolved with IV fluids. Please follow up with your primary care physician, your blood pressure medication was held because of fall and low-normal blood pressure. While in the hospital while not on the medication, your BP was normal and therefore it was not restarted during that time.

## 2017-07-18 NOTE — H&P ADULT - PROBLEM SELECTOR PLAN 2
- Seen on CT neck, seen by trauma and neurosurgery   - CTA neg  - C-collar for 4 weeks   - neuro checks Q4  - pain control - Fall on stairs likely multifactorial given chronic L sided weakness, late hour, carrying objects, and large amount of current medications, possible syncope   - will monitor on tele for any arrythmia and recheck QTc tomorrow, admission EKG showed QTc 472  - monitor BP for signs of hypotension and check orthostatics in am

## 2017-07-18 NOTE — DISCHARGE NOTE ADULT - MEDICATION SUMMARY - MEDICATIONS TO TAKE
I will START or STAY ON the medications listed below when I get home from the hospital:    acetaminophen 325 mg oral tablet  -- 2 tab(s) by mouth every 6 hours, As needed, Mild Pain (1 - 3)  -- Indication: For Pain    oxyCODONE 5 mg oral tablet  -- 1 tab(s) by mouth every 4 hours, As needed, Moderate Pain (4 - 6)  -- Indication: For Pain    oxyCODONE 10 mg oral tablet  -- 1 tab(s) by mouth every 6 hours, As needed, Severe Pain (7 - 10)  -- Indication: For Pain    aspirin 81 mg oral delayed release tablet  -- 1 tab(s) by mouth once a day  -- Indication: For CVA, old, hemiparesis    PARoxetine 30 mg oral tablet  -- 1 tab(s) by mouth once a day  -- Indication: For Depression    atorvastatin 20 mg oral tablet  -- 1 tab(s) by mouth once a day  -- Indication: For CVA, old, hemiparesis    Abilify 2 mg oral tablet  -- 1 tab(s) by mouth once a day  -- Indication: For Depression    doxepin 75 mg oral capsule  -- 2 cap(s) by mouth once a day (at bedtime)  -- Indication: For Depression    busPIRone 30 mg oral tablet  -- 1 tab(s) by mouth 2 times a day  -- Indication: For Depression    ibandronate 150 mg oral tablet  -- 1 tab(s) by mouth once a month  -- Indication: For osteoporosis

## 2017-07-18 NOTE — PROGRESS NOTE ADULT - SUBJECTIVE AND OBJECTIVE BOX
Patient is a 67y old  Female who presents with a chief complaint of Mechanical fall (2017 00:46)        SUBJECTIVE / OVERNIGHT EVENTS: Patient was admitted to the hospital last night. No acute events since admission. Patient is resting comfortably in bed. She denies headache, vision disturbances, chest pain, sob. She says her right arm feels achey since last night.       MEDICATIONS  (STANDING):  enoxaparin Injectable 40 milliGRAM(s) SubCutaneous daily  lisinopril 20 milliGRAM(s) Oral daily  PARoxetine 30 milliGRAM(s) Oral daily  atorvastatin 20 milliGRAM(s) Oral at bedtime  ARIPiprazole 2 milliGRAM(s) Oral daily  busPIRone 30 milliGRAM(s) Oral two times a day  doxepin 150 milliGRAM(s) Oral daily    MEDICATIONS  (PRN):  oxyCODONE    IR 5 milliGRAM(s) Oral every 4 hours PRN Moderate Pain (4 - 6)  oxyCODONE    IR 10 milliGRAM(s) Oral every 6 hours PRN Severe Pain (7 - 10)  acetaminophen   Tablet. 650 milliGRAM(s) Oral every 6 hours PRN Mild Pain (1 - 3)      Vital Signs Last 24 Hrs  T(C): 36.5 (17 @ 05:35), Max: 36.8 (17 @ 20:15)  HR: 80 (17 @ 05:35) (66 - 80)  BP: 117/74 (17 @ 05:35) (117/74 - 141/84)  RR: 17 (17 @ 04:51) (17 - 18)  SpO2: 94% (17 @ 04:51) (94% - 99%)  CAPILLARY BLOOD GLUCOSE        I&O's Summary    2017 07:01  -  2017 08:51  --------------------------------------------------------  IN: 0 mL / OUT: 100 mL / NET: -100 mL        PHYSICAL EXAM  GENERAL: NAD, well-developed  HEAD:  Atraumatic, Normocephalic  EYES: EOMI, PERRLA, conjunctiva and sclera clear  NECK: Supple, No JVD  CHEST/LUNG: Clear to auscultation bilaterally; No wheeze  HEART: Regular rate and rhythm; No murmurs, rubs, or gallops  ABDOMEN: Soft, Nontender, Nondistended; Bowel sounds present  EXTREMITIES:  2+ Peripheral Pulses, No clubbing, cyanosis, or edema  PSYCH: AAOx3  SKIN: No rashes or lesions    LABS:                        10.6   8.25  )-----------( 401      ( 2017 07:50 )             32.7     07-17    141  |  105  |  14  ----------------------------<  105<H>  3.2<L>   |  22  |  1.02    Ca    9.1      2017 06:25    TPro  6.9  /  Alb  3.6  /  TBili  0.3  /  DBili  x   /  AST  49<H>  /  ALT  35  /  AlkPhos  85      PT/INR - ( 2017 06:25 )   PT: 11.0 sec;   INR: 1.01 ratio         PTT - ( 2017 06:25 )  PTT:26.4 sec  CARDIAC MARKERS ( 2017 00:40 )  x     / <0.01 ng/mL / x     / x     / x      CARDIAC MARKERS ( 2017 06:25 )  x     / <0.01 ng/mL / 940 U/L / x     / 12.6 ng/mL      Urinalysis Basic - ( 2017 06:03 )    Color: Yellow / Appearance: Clear / S.013 / pH: x  Gluc: x / Ketone: Negative  / Bili: Negative / Urobili: Negative   Blood: x / Protein: Trace / Nitrite: Negative   Leuk Esterase: Trace / RBC: 0-2 /HPF / WBC 6-10 /HPF   Sq Epi: x / Non Sq Epi: OCC /HPF / Bacteria: x        RADIOLOGY & ADDITIONAL TESTS:    Imaging Personally Reviewed:  Consultant(s) Notes Reviewed:    Care Discussed with Consultants/Other Providers: Patient is a 67y old  Female who presents with a chief complaint of Mechanical fall (2017 00:46)        SUBJECTIVE / OVERNIGHT EVENTS: Patient was admitted to the hospital last night. No acute events since admission. Patient is resting comfortably in bed. She denies headache, vision disturbances, chest pain, sob. She says her right arm feels achey since last night.       MEDICATIONS  (STANDING):  enoxaparin Injectable 40 milliGRAM(s) SubCutaneous daily  lisinopril 20 milliGRAM(s) Oral daily  PARoxetine 30 milliGRAM(s) Oral daily  atorvastatin 20 milliGRAM(s) Oral at bedtime  ARIPiprazole 2 milliGRAM(s) Oral daily  busPIRone 30 milliGRAM(s) Oral two times a day  doxepin 150 milliGRAM(s) Oral daily    MEDICATIONS  (PRN):  oxyCODONE    IR 5 milliGRAM(s) Oral every 4 hours PRN Moderate Pain (4 - 6)  oxyCODONE    IR 10 milliGRAM(s) Oral every 6 hours PRN Severe Pain (7 - 10)  acetaminophen   Tablet. 650 milliGRAM(s) Oral every 6 hours PRN Mild Pain (1 - 3)      Vital Signs Last 24 Hrs  T(C): 36.5 (17 @ 05:35), Max: 36.8 (17 @ 20:15)  HR: 80 (17 @ 05:35) (66 - 80)  BP: 117/74 (17 @ 05:35) (117/74 - 141/84)  RR: 17 (17 @ 04:51) (17 - 18)  SpO2: 94% (17 @ 04:51) (94% - 99%)  CAPILLARY BLOOD GLUCOSE        I&O's Summary    2017 07:01  -  2017 08:51  --------------------------------------------------------  IN: 0 mL / OUT: 100 mL / NET: -100 mL        PHYSICAL EXAM  GENERAL: nad, comfortable, lying in bed with a C-collar  HEAD: Normocephalic  EYES: EOMI, PERRLA, conjunctiva and sclera clear, +echymosis below right eye  NECK: Supple, No JVD  CHEST/LUNG: Clear to auscultation bilaterally; No wheezes or rales  HEART: Regular rate and rhythm; No murmurs, rubs, or gallops  ABDOMEN: Soft, Nontender, Nondistended; Bowel sounds present  EXTREMITIES:  2+ Peripheral Pulses, No clubbing, cyanosis, or edema  PSYCH: AAOx3  SKIN: No rashes or lesions    LABS:                        10.6   8.25  )-----------( 401      ( 2017 07:50 )             32.7         141  |  105  |  14  ----------------------------<  105<H>  3.2<L>   |  22  |  1.02    Ca    9.1      2017 06:25    TPro  6.9  /  Alb  3.6  /  TBili  0.3  /  DBili  x   /  AST  49<H>  /  ALT  35  /  AlkPhos  85  17    PT/INR - ( 2017 06:25 )   PT: 11.0 sec;   INR: 1.01 ratio         PTT - ( 2017 06:25 )  PTT:26.4 sec  CARDIAC MARKERS ( 2017 00:40 )  x     / <0.01 ng/mL / x     / x     / x      CARDIAC MARKERS ( 2017 06:25 )  x     / <0.01 ng/mL / 940 U/L / x     / 12.6 ng/mL      Urinalysis Basic - ( 2017 06:03 )    Color: Yellow / Appearance: Clear / S.013 / pH: x  Gluc: x / Ketone: Negative  / Bili: Negative / Urobili: Negative   Blood: x / Protein: Trace / Nitrite: Negative   Leuk Esterase: Trace / RBC: 0-2 /HPF / WBC 6-10 /HPF   Sq Epi: x / Non Sq Epi: OCC /HPF / Bacteria: x        RADIOLOGY & ADDITIONAL TESTS:    Imaging Personally Reviewed:  Consultant(s) Notes Reviewed:    Care Discussed with Consultants/Other Providers: Patient is a 67y old  Female who presents with a chief complaint of Mechanical fall (2017 00:46)        SUBJECTIVE / OVERNIGHT EVENTS: Patient was admitted to the hospital last night. No acute events since admission. Patient is resting comfortably in bed. She denies headache, vision disturbances, chest pain, sob. She says her right arm feels achey since last night.       MEDICATIONS  (STANDING):  enoxaparin Injectable 40 milliGRAM(s) SubCutaneous daily  lisinopril 20 milliGRAM(s) Oral daily  PARoxetine 30 milliGRAM(s) Oral daily  atorvastatin 20 milliGRAM(s) Oral at bedtime  ARIPiprazole 2 milliGRAM(s) Oral daily  busPIRone 30 milliGRAM(s) Oral two times a day  doxepin 150 milliGRAM(s) Oral daily    MEDICATIONS  (PRN):  oxyCODONE    IR 5 milliGRAM(s) Oral every 4 hours PRN Moderate Pain (4 - 6)  oxyCODONE    IR 10 milliGRAM(s) Oral every 6 hours PRN Severe Pain (7 - 10)  acetaminophen   Tablet. 650 milliGRAM(s) Oral every 6 hours PRN Mild Pain (1 - 3)      Vital Signs Last 24 Hrs  T(C): 36.5 (17 @ 05:35), Max: 36.8 (17 @ 20:15)  HR: 80 (17 @ 05:35) (66 - 80)  BP: 117/74 (17 @ 05:35) (117/74 - 141/84)  RR: 17 (17 @ 04:51) (17 - 18)  SpO2: 94% (17 @ 04:51) (94% - 99%)  CAPILLARY BLOOD GLUCOSE        I&O's Summary    2017 07:01  -  2017 08:51  --------------------------------------------------------  IN: 0 mL / OUT: 100 mL / NET: -100 mL        PHYSICAL EXAM  GENERAL: NAD, comfortable, lying in bed   HEAD: Normocephalic  EYES: EOMI, PERRLA, conjunctiva and sclera clear, +echymosis below right eye  NECK: C-collar. No JVD  CHEST/LUNG: Clear to auscultation bilaterally; No wheezes or rales  HEART: Regular rate and rhythm; No murmurs, rubs, or gallops  ABDOMEN: Soft, Nontender, Nondistended; Bowel sounds present  EXTREMITIES:  2+ Peripheral Pulses, No clubbing, cyanosis, or edema  NEURO: AAOx3, cranial nerves intact, speech fluent, strength 5/5 in bilateral UE's   SKIN: No rashes or lesions    LABS:                        10.6   8.25  )-----------( 401      ( 2017 07:50 )             32.7     07-    141  |  105  |  14  ----------------------------<  105<H>  3.2<L>   |  22  |  1.02    Ca    9.1      2017 06:25    TPro  6.9  /  Alb  3.6  /  TBili  0.3  /  DBili  x   /  AST  49<H>  /  ALT  35  /  AlkPhos  85  07-17    PT/INR - ( 2017 06:25 )   PT: 11.0 sec;   INR: 1.01 ratio         PTT - ( 2017 06:25 )  PTT:26.4 sec  CARDIAC MARKERS ( 2017 00:40 )  x     / <0.01 ng/mL / x     / x     / x      CARDIAC MARKERS ( 2017 06:25 )  x     / <0.01 ng/mL / 940 U/L / x     / 12.6 ng/mL      Urinalysis Basic - ( 2017 06:03 )    Color: Yellow / Appearance: Clear / S.013 / pH: x  Gluc: x / Ketone: Negative  / Bili: Negative / Urobili: Negative   Blood: x / Protein: Trace / Nitrite: Negative   Leuk Esterase: Trace / RBC: 0-2 /HPF / WBC 6-10 /HPF   Sq Epi: x / Non Sq Epi: OCC /HPF / Bacteria: x        RADIOLOGY & ADDITIONAL TESTS:    Imaging Personally Reviewed:  Consultant(s) Notes Reviewed:    Care Discussed with Consultants/Other Providers:

## 2017-07-18 NOTE — PROGRESS NOTE ADULT - PROBLEM SELECTOR PLAN 1
- Seen on CT neck, seen by trauma and neurosurgery   - CTA neg  - C-collar for 4 weeks   - neuro checks Q4  - pain control  - PT eval - Seen on CT neck, seen by trauma and neurosurgery   - CTA neg  - C-collar for 4 weeks   - neuro checks Q4  - pain control  - PT eval ordered  -MRI head ordered - Seen on CT neck, seen by trauma and neurosurgery   - CTA neg  - C-collar for 4 weeks as per neurosurgery. She should follow up as an outpatient with Gurdeep Chauhan from neurosurgery.   - neuro checks Q4  - pain control  - PT eval ordered  -MRI head ordered - Seen on CT neck, seen by trauma and neurosurgery   - CTA neg  - C-collar for 4 weeks as per neurosurgery. She should follow up as an outpatient with Gurdeep Chauhan from neurosurgery.   - neuro checks Q4  - pain control  - PT eval ordered

## 2017-07-18 NOTE — PROGRESS NOTE ADULT - PROBLEM SELECTOR PLAN 5
- DVT ppx- lovenox - hx of depression and anxiety   - c/w home meds  - Abilify, Paxil, Doxepin, and Buspirone - resolved s/p IVF, monitor

## 2017-07-18 NOTE — H&P ADULT - ASSESSMENT
68 y/o F w/ PMHx of depression, CVA with residual L sided weakness, HTN and HLD, here 3 days prior 2/2 mechanical fall from tripped on a cord c/b R eye ecchymosis presents s/p 2nd fall down stairs (about 5 steps high) hitting the R side of her head found to have a C2 fracture in ED.

## 2017-07-18 NOTE — H&P ADULT - NSHPLABSRESULTS_GEN_ALL_CORE
11.3   11.5  )-----------( 322      ( 2017 06:25 )             34.3           141  |  105  |  14  ----------------------------<  105<H>  3.2<L>   |  22  |  1.02    Ca    9.1      2017 06:25    TPro  6.9  /  Alb  3.6  /  TBili  0.3  /  DBili  x   /  AST  49<H>  /  ALT  35  /  AlkPhos  85                Urinalysis Basic - ( 2017 06:03 )    Color: Yellow / Appearance: Clear / S.013 / pH: x  Gluc: x / Ketone: Negative  / Bili: Negative / Urobili: Negative   Blood: x / Protein: Trace / Nitrite: Negative   Leuk Esterase: Trace / RBC: 0-2 /HPF / WBC 6-10 /HPF   Sq Epi: x / Non Sq Epi: OCC /HPF / Bacteria: x        PT/INR - ( 2017 06:25 )   PT: 11.0 sec;   INR: 1.01 ratio         PTT - ( 2017 06:25 )  PTT:26.4 sec        CARDIAC MARKERS ( 2017 06:25 )  x     / <0.01 ng/mL / 940 U/L / x     / 12.6 ng/mL      < from: Xray Chest 1 View AP- PORTABLE-Urgent (17 @ 07:18) >    IMPRESSION:    Left lower lung linear atelectasis.  The remainder of the lungs are clear.    < end of copied text >    < from: CT Head and Cervical Spine No Cont (17 @ 09:20) >    IMPRESSION:    No intracranial hemorrhage or evidence of acute intracranial pathology.    No fracture.    Chronic right maxillary sinusitis.IMPRESSION:  C2 left transverse process fracture extending through the   transverse foramen with a mildly depressed superior fracture fragment  impinging upon the transverse foramen. CT angiography or MR angiography   is advised to exclude injury to the vertebral artery.    Small ossific fragment within the C2-C3 left neural foramen likely   arising from the C2 inferior articulating facet.    < end of copied text >    < from: CT Angio Neck w/ IV Cont (17 @ 20:10) >    IMPRESSION:     CT angiography neck: No evidence of arterial injury. Redemonstration of   C2 left transverse process fracture as previously described on prior   cervical spine CT.     CT angiography brain: No major vessel occlusion or proximal stenosis.    Normal anatomic variants as discussed in the body the report.      < end of copied text > 11.3   11.5  )-----------( 322      ( 2017 06:25 )             34.3           141  |  105  |  14  ----------------------------<  105<H>  3.2<L>   |  22  |  1.02    Ca    9.1      2017 06:25    TPro  6.9  /  Alb  3.6  /  TBili  0.3  /  DBili  x   /  AST  49<H>  /  ALT  35  /  AlkPhos  85                Urinalysis Basic - ( 2017 06:03 )    Color: Yellow / Appearance: Clear / S.013 / pH: x  Gluc: x / Ketone: Negative  / Bili: Negative / Urobili: Negative   Blood: x / Protein: Trace / Nitrite: Negative   Leuk Esterase: Trace / RBC: 0-2 /HPF / WBC 6-10 /HPF   Sq Epi: x / Non Sq Epi: OCC /HPF / Bacteria: x        PT/INR - ( 2017 06:25 )   PT: 11.0 sec;   INR: 1.01 ratio         PTT - ( 2017 06:25 )  PTT:26.4 sec        CARDIAC MARKERS ( 2017 06:25 )  x     / <0.01 ng/mL / 940 U/L / x     / 12.6 ng/mL      < from: Xray Chest 1 View AP- PORTABLE-Urgent (17 @ 07:18) >    IMPRESSION:    Left lower lung linear atelectasis.  The remainder of the lungs are clear.    < end of copied text >    < from: CT Head and Cervical Spine No Cont (17 @ 09:20) >    IMPRESSION:    No intracranial hemorrhage or evidence of acute intracranial pathology.    No fracture.    Chronic right maxillary sinusitis.IMPRESSION:  C2 left transverse process fracture extending through the   transverse foramen with a mildly depressed superior fracture fragment  impinging upon the transverse foramen. CT angiography or MR angiography   is advised to exclude injury to the vertebral artery.    Small ossific fragment within the C2-C3 left neural foramen likely   arising from the C2 inferior articulating facet.    < end of copied text >    < from: CT Angio Neck w/ IV Cont (17 @ 20:10) >    IMPRESSION:     CT angiography neck: No evidence of arterial injury. Redemonstration of   C2 left transverse process fracture as previously described on prior   cervical spine CT.     CT angiography brain: No major vessel occlusion or proximal stenosis.    Normal anatomic variants as discussed in the body the report.      < end of copied text >    EKG- personally interpreted NSR, HR 65, TWI in V1-V3, QTc 472, No ST seg elevations or depressions

## 2017-07-18 NOTE — DISCHARGE NOTE ADULT - MEDICATION SUMMARY - MEDICATIONS TO STOP TAKING
I will STOP taking the medications listed below when I get home from the hospital:    ramipril 5 mg oral capsule  -- 1 cap(s) by mouth once a day

## 2017-07-18 NOTE — PROGRESS NOTE ADULT - PROBLEM SELECTOR PLAN 4
- hx of depression and anxiety   - c/w home meds  - Abilify, Paxil, Doxepin, and Buspirone - borderline low BP, hold ACE

## 2017-07-18 NOTE — H&P ADULT - PROBLEM SELECTOR PROBLEM 2
Closed nondisplaced fracture of second cervical vertebra, unspecified fracture morphology, initial encounter Fall, initial encounter

## 2017-07-18 NOTE — DISCHARGE NOTE ADULT - CARE PROVIDER_API CALL
Sai Liang), Neurological Surgery  [address] [place] [address]  Phone: [phone]  Fax: [phone]    Bhupendra Mandujano (MD), Cardiovascular Disease; Internal Medicine  [address] [place] [address] [id number]  [place] [address]  Phone: [phone]  Fax: [phone]

## 2017-07-18 NOTE — DISCHARGE NOTE ADULT - CARE PROVIDERS DIRECT ADDRESSES
[email or web address]_Unknown

## 2017-07-18 NOTE — PROGRESS NOTE ADULT - ATTENDING COMMENTS
Mechanical fall with closed C2 fracture, c-collar x 4 weeks, pain control, PT eval   Old CVA with reported worsening left hemiparesis, c/w statin and restart ASA, MRI brain ordered

## 2017-07-18 NOTE — PROVIDER CONTACT NOTE (OTHER) - ASSESSMENT
Pt remained alert and oriented x4.  supine:  bp 95/65, pulse 77, sittin/62 pulse 90.  Pt reported dizziness on standing.  Unable to remain standing for bp measurement.

## 2017-07-18 NOTE — H&P ADULT - NSHPPHYSICALEXAM_GEN_ALL_CORE
PHYSICAL EXAM:    GENERAL: Comfortable, no acute distress   HEAD:  Normocephalic, atraumatic  EYES: EOMI, PERRLA  HEENT: Moist mucous membranes  NECK: Supple, No JVD  NERVOUS SYSTEM:  Alert & Oriented X3, Motor Strength 5/5 in R upper and lower extremities, Motor Strength 4/5 in L upper and lower extremities,  CHEST/LUNG: Clear to auscultation bilaterally  HEART: Regular rate and rhythm, no murmur   ABDOMEN: Soft, Nontender, Nondistended, Bowel sounds present  EXTREMITIES:   No clubbing, cyanosis, or edema  MUSCULOSKELETAL: No muscle tenderness, no joint tenderness  SKIN:  warm and dry, no rash Vital Signs Last 24 Hrs  T(C): 36.5 (18 Jul 2017 01:57), Max: 36.8 (17 Jul 2017 20:15)  T(F): 97.7 (18 Jul 2017 01:57), Max: 98.2 (17 Jul 2017 20:15)  HR: 71 (18 Jul 2017 01:57) (64 - 84)  BP: 121/81 (18 Jul 2017 01:57) (115/75 - 141/84)  BP(mean): --  RR: 18 (18 Jul 2017 01:57) (17 - 18)  SpO2: 94% (18 Jul 2017 01:57) (94% - 100%)    PHYSICAL EXAM:    GENERAL: Comfortable, no acute distress   HEAD:  Normocephalic, atraumatic  EYES: EOMI, PERRLA  HEENT: Moist mucous membranes  NECK: Supple, No JVD  NERVOUS SYSTEM:  Alert & Oriented X3, Motor Strength 5/5 in R upper and lower extremities, Motor Strength 4/5 in L upper and lower extremities,  CHEST/LUNG: Clear to auscultation bilaterally  HEART: Regular rate and rhythm, no murmur   ABDOMEN: Soft, Nontender, Nondistended, Bowel sounds present  EXTREMITIES:   No clubbing, cyanosis, or edema  MUSCULOSKELETAL: No muscle tenderness, no joint tenderness  SKIN:  warm and dry, no rash Vital Signs Last 24 Hrs  T(C): 36.5 (18 Jul 2017 01:57), Max: 36.8 (17 Jul 2017 20:15)  T(F): 97.7 (18 Jul 2017 01:57), Max: 98.2 (17 Jul 2017 20:15)  HR: 71 (18 Jul 2017 01:57) (64 - 84)  BP: 121/81 (18 Jul 2017 01:57) (115/75 - 141/84)  BP(mean): --  RR: 18 (18 Jul 2017 01:57) (17 - 18)  SpO2: 94% (18 Jul 2017 01:57) (94% - 100%)    PHYSICAL EXAM:    GENERAL: Comfortable lying in bed in C-collar, no acute distress   HEAD:  Normocephalic, atraumatic  EYES: EOMI, PERRLA, + ecchymosis below R eye   HEENT: Moist mucous membranes  NECK: Supple, No JVD  NERVOUS SYSTEM:  Alert & Oriented X3, Motor Strength 5/5 in R upper and lower extremities, Motor Strength 4/5 in L upper and lower extremities,  CHEST/LUNG: Clear to auscultation bilaterally  HEART: Regular rate and rhythm, no murmur   ABDOMEN: Soft, Nontender, Nondistended, Bowel sounds present  EXTREMITIES:   No clubbing, cyanosis, or edema  MUSCULOSKELETAL: No muscle tenderness, no joint tenderness  SKIN:  warm and dry

## 2017-07-18 NOTE — DISCHARGE NOTE ADULT - HOSPITAL COURSE
Patient is a 67 y o woman with PMHx of depression, CVA with residual left sided weakness, HTN and HLD, in the ED 3 days prior after tripping on a cord c/b right eye ecchymosis, presented to the emergency department s/p fall down stairs (about 5 steps high) hitting the right side of her head, and found to have a closed C2 fracture on CT in the ED. Patient subsequently had an MRI brain for headache and dizziness, which was found to show no acute hemorrhage or infarct. On initial presentation, patient was also found to have an elevated CK due to rhabdomyolysis from the fall. Patient is a 67 y o woman with PMHx of depression, CVA with residual left sided weakness, HTN and HLD, in the ED 3 days prior after tripping on a cord c/b right eye ecchymosis, presented to the emergency department s/p fall down stairs (about 5 steps high) hitting the right side of her head, and found to have a closed C2 fracture on CT in the ED.  A CTA was done in the ED and found to be negative as well. She was seen by both the trauma and neurosurgery services while in the emergency room. Patient subsequently had an MRI brain for headache and dizziness, which was negative for acute hemorrhage or infarct. On initial presentation, patient was also found to have an elevated CPK due to traumatic rhabdomyolysis. She received IVF and CPK downtrended. She also presented with prerenal azotemia that resolved with IVF. Patient presented with hypokalemia, which resolved following potassium supplementation. Her ACEi was held while hospitalized due to borderline hypotension Patient was monitored on telemetry and remained in sinus rhythm throughout. Her fall was likely multifactorial given chronic L sided weakness, late hour, carrying objects, and large amount of current psych meds. Patient was seen by PT who....Patient stable and ready for discharge. Patient is a 67 y o woman with PMHx of depression, CVA with residual left sided weakness, HTN and HLD, in the ED 3 days prior after tripping on a cord c/b right eye ecchymosis, presented to the emergency department s/p fall down stairs (about 5 steps high) hitting the right side of her head, and found to have a closed C2 fracture on CT in the ED.  A CTA was done in the ED and found to be negative as well. She was seen by both the trauma and neurosurgery services while in the emergency room. Patient subsequently had an MRI brain for headache and dizziness, which was negative for acute hemorrhage or infarct. On initial presentation, patient was also found to have an elevated CPK due to traumatic rhabdomyolysis. She received IVF and CPK downtrended. She also presented with prerenal azotemia that resolved with IVF. Patient presented with hypokalemia, which resolved following potassium supplementation. Her ACEi was held while hospitalized due to borderline hypotension Patient was monitored on telemetry and remained in sinus rhythm throughout. Her fall was likely multifactorial given chronic L sided weakness, late hour, carrying objects, and large amount of current psych meds. Patient was seen by PT who recommended [facility].    On discharge, the patient was medically optimized for disposition to [facility] for further rehabilitation after fall with the discharge diagnosis of Stable C2 foramen fracture. The patient is continue with neck collar until evaluated by Neurosurgery 4 weeks from discharge. Additionally, the patient is to hold ramipril until evaluation by a physician following discharge to deem if necessary. Patient is a 67 y o woman with PMHx of depression, CVA with residual left sided weakness, HTN and HLD, in the ED 3 days prior after tripping on a cord c/b right eye ecchymosis, presented to the emergency department s/p fall down stairs (about 5 steps high) hitting the right side of her head, and found to have an acute closed C2 fracture on CT in the ED.  A CTA was done in the ED and found to be negative as well. She was seen by both the trauma and neurosurgery services while in the emergency room. Patient subsequently had an MRI brain for headache and dizziness, which was negative for acute hemorrhage or infarct. On initial presentation, patient was also found to have an elevated CPK due to traumatic rhabdomyolysis. She received IVF and CPK downtrended. She also presented with prerenal azotemia that resolved with IVF. Patient presented with hypokalemia, which resolved following potassium supplementation. Her ACEi was held while hospitalized due to borderline hypotension Patient was monitored on telemetry and remained in sinus rhythm throughout. Her fall was likely multifactorial given chronic L sided weakness from old CVA, late hour, carrying objects, and large amount of current psych meds. Persistent dizziness and headache likely related to post-concussion syndrome, orthostatics negative. Patient was seen by PT who recommended [facility].    On discharge, the patient was medically optimized for disposition to [facility] for further rehabilitation after fall with the discharge diagnosis of Stable C2 foramen fracture. The patient is continue with neck collar until evaluated by Neurosurgery 4 weeks from discharge. Additionally, the patient is to hold ramipril until evaluation by a physician following discharge to deem if necessary. Discharge time 34 minutes.

## 2017-07-18 NOTE — DISCHARGE NOTE ADULT - CARE PLAN
Principal Discharge DX:	C2 cervical fracture  Goal:	C spine recovery  Secondary Diagnosis:	Hypertension  Goal:	Maintain healthy blood pressure  Secondary Diagnosis:	Anxiety  Goal:	Control symptoms  Secondary Diagnosis:	Depression  Goal:	control symptoms  Secondary Diagnosis:	Hyperlipidemia  Goal:	Maintain healthy blood cholesterol level Principal Discharge DX:	C2 cervical fracture  Goal:	C spine recovery  Instructions for follow-up, activity and diet:	You had a fall that resulted in a closed fracture of your C2 vertebra. You should continue to wear your C-Collar for a total of 4 weeks. You should continue to take pain medication as prescribed. Please continue to follow up with recommendations. Please see Dr. Mackenzie from neurosurgery in 4 weeks for a follow up appointment.  If you notice increased weakness, numbness or tingling, headache, nausea, or vomiting, please return to the emergency room.  Secondary Diagnosis:	Hypertension  Goal:	Maintain healthy blood pressure  Instructions for follow-up, activity and diet:	Please continue to take your blood pressure medication as prescribed. Please follow up with your primary care physician.  Secondary Diagnosis:	Anxiety  Goal:	Control symptoms  Instructions for follow-up, activity and diet:	Please continue to take your home psychiatric medications.  Secondary Diagnosis:	Depression  Goal:	control symptoms  Instructions for follow-up, activity and diet:	Please continue to take your home psychiatric medications.  Secondary Diagnosis:	Hyperlipidemia  Goal:	Maintain healthy blood cholesterol level  Instructions for follow-up, activity and diet:	Please continue to take your statin and follow up with your primary care physician. Principal Discharge DX:	C2 cervical fracture  Goal:	C spine recovery  Instructions for follow-up, activity and diet:	You had a fall that resulted in a closed fracture of your C2 vertebra. You should continue to wear your C-Collar for a total of 4 weeks. You should continue to take pain medication as prescribed. Please continue to follow up with recommendations. Please see Dr. Mackenzie from neurosurgery in 4 weeks for a follow up appointment.  If you notice increased weakness, numbness or tingling, headache, nausea, or vomiting, please return to the emergency room.  Secondary Diagnosis:	Hypertension  Goal:	Maintain healthy blood pressure  Instructions for follow-up, activity and diet:	Please continue to take your blood pressure medication as prescribed. Please follow up with your primary care physician.  Secondary Diagnosis:	Anxiety  Goal:	Control symptoms  Instructions for follow-up, activity and diet:	Please continue to take your home psychiatric medications.  Secondary Diagnosis:	Depression  Goal:	control symptoms  Instructions for follow-up, activity and diet:	Please continue to take your home psychiatric medications.  Secondary Diagnosis:	Hyperlipidemia  Goal:	Maintain healthy blood cholesterol level  Instructions for follow-up, activity and diet:	Please continue to take your statin and follow up with your primary care physician.  Secondary Diagnosis:	Hypokalemia  Goal:	normalize potassium  Instructions for follow-up, activity and diet:	You were found to have low potassium. This resolved with K supplementation  Secondary Diagnosis:	Traumatic rhabdomyolysis, initial encounter  Goal:	Resolve Rhabdo  Instructions for follow-up, activity and diet:	You had some rhabdomyolysis following your fall. This resolved with IV fluids. Principal Discharge DX:	C2 cervical fracture  Goal:	C spine recovery  Instructions for follow-up, activity and diet:	You had a fall that resulted in a closed fracture of your C2 vertebra. You should continue to wear your C-Collar for a total of 4 weeks. You should continue to take pain medication as prescribed. Please continue to follow up with recommendations. Please see Dr. Mackenzie from neurosurgery in 4 weeks for a follow up appointment.  If you notice increased weakness, numbness or tingling, headache, nausea, or vomiting, please return to the emergency room.  Secondary Diagnosis:	Hypertension  Goal:	Maintain healthy blood pressure  Instructions for follow-up, activity and diet:	Please follow up with your primary care physician, your blood pressure medication was held because of fall and low-normal blood pressure. While in the hospital while not on the medication, your BP was normal and therefore it was not restarted during that time.  Secondary Diagnosis:	Anxiety  Goal:	Control symptoms  Instructions for follow-up, activity and diet:	Please continue to take your home psychiatric medications.  Secondary Diagnosis:	Depression  Goal:	control symptoms  Instructions for follow-up, activity and diet:	Please continue to take your home psychiatric medications.  Secondary Diagnosis:	Hyperlipidemia  Goal:	Maintain healthy blood cholesterol level  Instructions for follow-up, activity and diet:	Please continue to take your statin and follow up with your primary care physician.  Secondary Diagnosis:	Hypokalemia  Goal:	normalize potassium  Instructions for follow-up, activity and diet:	You were found to have low potassium. This resolved with K supplementation  Secondary Diagnosis:	Traumatic rhabdomyolysis, initial encounter  Goal:	Resolve Rhabdo  Instructions for follow-up, activity and diet:	You had some rhabdomyolysis following your fall. This resolved with IV fluids.

## 2017-07-18 NOTE — PROGRESS NOTE ADULT - PROBLEM SELECTOR PLAN 2
- Fall on stairs likely multifactorial given chronic L sided weakness, late hour, carrying objects, and large amount of current medications, possible syncope   - will monitor on tele for any arrythmia and recheck QTc tomorrow, admission EKG showed QTc 472  - monitor BP for signs of hypotension and check orthostatics in am - Fall on stairs likely multifactorial given chronic L sided weakness, late hour, carrying objects, and large amount of current medications, possible syncope   - will monitor on tele for any arrythmia and recheck QTc, admission EKG showed QTc 472  - monitor BP for signs of hypotension and check orthostatics  -Troponins negx3 - Fall on stairs likely multifactorial given chronic L sided weakness, late hour, carrying objects, and large amount of current medications. Denies LOC.   - will monitor on tele for any arrythmia and recheck QTc, admission EKG showed QTc 472  - monitor BP for signs of hypotension and check orthostatics  -Troponins negx3

## 2017-07-18 NOTE — DISCHARGE NOTE ADULT - SECONDARY DIAGNOSIS.
Hypertension Anxiety Depression Hyperlipidemia Hypokalemia Traumatic rhabdomyolysis, initial encounter

## 2017-07-18 NOTE — H&P ADULT - PROBLEM SELECTOR PLAN 1
- Fall on stairs likely multifactorial given chronic L sided weakness, late hour, carrying objects, and large amount of current medications   - will monitor on tele for any arrythmia and recheck QTc tomorrow, admission EKG showed QTc 472  - monitor BP for signs of hypotension and check orthostatics in am - Seen on CT neck, seen by trauma and neurosurgery   - CTA neg  - C-collar for 4 weeks   - neuro checks Q4  - pain control  - PT eval

## 2017-07-18 NOTE — H&P ADULT - HISTORY OF PRESENT ILLNESS
66 y/o F w/ PMHx of depression CVA with residual L sided weakness, here 3 days prior 2/2 mechanical fall from tripped on a cord c/b R eye ecchymosis presents s/p 2nd fall down stairs (about 5 steps high) hitting the R side of her head found to have a C2 fracture in ED.     In ED,   Vitals T 97.7, HR 64, /75, RR 18, SpO2 94 on RA   Labs- WBC 11.5, K 3.2, Hgb 11.3,   Given KCl, 1 gr IV tylenol, Morphine 4mg IV, 500cc NS 66 y/o F w/ PMHx of depression, CVA with residual L sided weakness, HTN and HLD, here 3 days prior 2/2 mechanical fall from tripped on a cord c/b R eye ecchymosis presents s/p 2nd fall down stairs (about 5 steps high) hitting the R side of her head found to have a C2 fracture in ED.   Patient reports having a fall about 3 days ago. Patient reports waking up in the middle of the night to go to the bathroom. They have been remodeling their house, so on the way to the bathroom in the dark, she tripped on a power cord on the floor. She landed on her R side and hit her face causing R eye ecchymosis. She went to the ED and they did a CT head and R sided xrays showing no acute fractures.   Last night, patient reports falling asleep downstairs and then waking up around 3am and heading upstairs. On her way up, she was carrying a box and several papers and fell down after ascending about 5 steps. She doesn't remember the events too well, but thinks she might have been a little dizzy when she fell and feeling very tired.   Denies any CP, palpations, SOB, F/C, N/V, syncope, or URI symptoms.     In ED,   Vitals T 97.7, HR 64, /75, RR 18, SpO2 94 on RA   Labs- WBC 11.5, K 3.2, Hgb 11.3,   Given KCl, 1 gr IV tylenol, Morphine 4mg IV, 500cc NS

## 2017-07-18 NOTE — PROGRESS NOTE ADULT - PROBLEM SELECTOR PROBLEM 1
Closed nondisplaced fracture of second cervical vertebra, unspecified fracture morphology, initial encounter

## 2017-07-18 NOTE — DISCHARGE NOTE ADULT - PATIENT PORTAL LINK FT
“You can access the [facility] Patient Portal, offered by [facility], by registering with the following website: [email or web address]”

## 2017-07-18 NOTE — PROGRESS NOTE ADULT - PROBLEM SELECTOR PLAN 3
- c/w ACEi w/ hold parameters - old CVA with residual left sided weakness, reports worsening left sided weakness   - continue statin, restart ASA 81mg  - check MRI brain

## 2017-07-18 NOTE — H&P ADULT - NSHPREVIEWOFSYSTEMS_GEN_ALL_CORE
Review of Systems:   CONSTITUTIONAL: No fever, weight changes, fatigue, appetite changes  EYES: No eye pain, visual disturbances, or discharge, + R eye bruise   ENMT:  No difficulty hearing, tinnitus, vertigo; No sinus or throat pain  NECK: + neck pain  RESPIRATORY: No cough, wheezing, chills or hemoptysis; No shortness of breath  CARDIOVASCULAR: No chest pain, palpitations, dizziness, or leg swelling  GASTROINTESTINAL: No abdominal or epigastric pain. No nausea, vomiting, or hematemesis; No diarrhea or constipation. No melena or hematochezia.  GENITOURINARY: No dysuria, frequency, hematuria, or incontinence  NEUROLOGICAL: No headaches, memory loss, numbness, or tremors, + L sided weakness   SKIN: No itching, burning, rashes, or lesions   ENDOCRINE: No heat or cold intolerance; No hair loss  MUSCULOSKELETAL: No joint pain or swelling; + neck pain   PSYCHIATRIC: No depression, anxiety, mood swings, or difficulty sleeping  HEME/LYMPH: No easy bruising, or bleeding gums  ALLERY AND IMMUNOLOGIC: No hives or eczema

## 2017-07-18 NOTE — H&P ADULT - PROBLEM SELECTOR PROBLEM 1
Fall, initial encounter Closed nondisplaced fracture of second cervical vertebra, unspecified fracture morphology, initial encounter

## 2017-07-18 NOTE — H&P ADULT - ATTENDING COMMENTS
68 y/o F w/ PMHx of depression, CVA with residual L sided weakness, HTN and HLD, p/w fall at home found to have a C2 fracture , seen by trauma and neurosurgery , who recommended no surgical intervention, no vascular compromise on CTA, patient is to remain in C collar, labs with mild leukocytosis , no localizing symptoms of infection and no evidence of infection on exam or other labs and imaging, EKG with mild QTc prolongation which is expected on patients' antidepressant regimen; Do not suspect syncope however will monitor on telemetry, patient should be seen by PT and may require temporary home services.

## 2017-07-19 LAB
ANION GAP SERPL CALC-SCNC: 15 MMOL/L — SIGNIFICANT CHANGE UP (ref 5–17)
BUN SERPL-MCNC: 10 MG/DL — SIGNIFICANT CHANGE UP (ref 7–23)
CALCIUM SERPL-MCNC: 8.6 MG/DL — SIGNIFICANT CHANGE UP (ref 8.4–10.5)
CHLORIDE SERPL-SCNC: 108 MMOL/L — SIGNIFICANT CHANGE UP (ref 96–108)
CK SERPL-CCNC: 342 U/L — HIGH (ref 25–170)
CO2 SERPL-SCNC: 20 MMOL/L — LOW (ref 22–31)
CREAT SERPL-MCNC: 0.79 MG/DL — SIGNIFICANT CHANGE UP (ref 0.5–1.3)
GLUCOSE SERPL-MCNC: 95 MG/DL — SIGNIFICANT CHANGE UP (ref 70–99)
HCT VFR BLD CALC: 33.6 % — LOW (ref 34.5–45)
HGB BLD-MCNC: 10.8 G/DL — LOW (ref 11.5–15.5)
MAGNESIUM SERPL-MCNC: 2.6 MG/DL — SIGNIFICANT CHANGE UP (ref 1.6–2.6)
MCHC RBC-ENTMCNC: 28.6 PG — SIGNIFICANT CHANGE UP (ref 27–34)
MCHC RBC-ENTMCNC: 32.1 GM/DL — SIGNIFICANT CHANGE UP (ref 32–36)
MCV RBC AUTO: 89.1 FL — SIGNIFICANT CHANGE UP (ref 80–100)
PHOSPHATE SERPL-MCNC: 2.7 MG/DL — SIGNIFICANT CHANGE UP (ref 2.5–4.5)
PLATELET # BLD AUTO: 387 K/UL — SIGNIFICANT CHANGE UP (ref 150–400)
POTASSIUM SERPL-MCNC: 3.9 MMOL/L — SIGNIFICANT CHANGE UP (ref 3.5–5.3)
POTASSIUM SERPL-SCNC: 3.9 MMOL/L — SIGNIFICANT CHANGE UP (ref 3.5–5.3)
RBC # BLD: 3.77 M/UL — LOW (ref 3.8–5.2)
RBC # FLD: 15.4 % — HIGH (ref 10.3–14.5)
SODIUM SERPL-SCNC: 143 MMOL/L — SIGNIFICANT CHANGE UP (ref 135–145)
WBC # BLD: 7.73 K/UL — SIGNIFICANT CHANGE UP (ref 3.8–10.5)
WBC # FLD AUTO: 7.73 K/UL — SIGNIFICANT CHANGE UP (ref 3.8–10.5)

## 2017-07-19 PROCEDURE — 99233 SBSQ HOSP IP/OBS HIGH 50: CPT | Mod: GC

## 2017-07-19 RX ADMIN — Medication 30 MILLIGRAM(S): at 11:41

## 2017-07-19 RX ADMIN — Medication 150 MILLIGRAM(S): at 11:40

## 2017-07-19 RX ADMIN — ATORVASTATIN CALCIUM 20 MILLIGRAM(S): 80 TABLET, FILM COATED ORAL at 20:34

## 2017-07-19 RX ADMIN — OXYCODONE HYDROCHLORIDE 5 MILLIGRAM(S): 5 TABLET ORAL at 06:07

## 2017-07-19 RX ADMIN — Medication 30 MILLIGRAM(S): at 18:30

## 2017-07-19 RX ADMIN — OXYCODONE HYDROCHLORIDE 5 MILLIGRAM(S): 5 TABLET ORAL at 19:31

## 2017-07-19 RX ADMIN — OXYCODONE HYDROCHLORIDE 5 MILLIGRAM(S): 5 TABLET ORAL at 23:59

## 2017-07-19 RX ADMIN — OXYCODONE HYDROCHLORIDE 5 MILLIGRAM(S): 5 TABLET ORAL at 23:26

## 2017-07-19 RX ADMIN — Medication 81 MILLIGRAM(S): at 11:40

## 2017-07-19 RX ADMIN — ENOXAPARIN SODIUM 40 MILLIGRAM(S): 100 INJECTION SUBCUTANEOUS at 11:40

## 2017-07-19 RX ADMIN — ARIPIPRAZOLE 2 MILLIGRAM(S): 15 TABLET ORAL at 11:40

## 2017-07-19 RX ADMIN — Medication 30 MILLIGRAM(S): at 06:04

## 2017-07-19 RX ADMIN — OXYCODONE HYDROCHLORIDE 5 MILLIGRAM(S): 5 TABLET ORAL at 18:36

## 2017-07-19 NOTE — PROGRESS NOTE ADULT - ATTENDING COMMENTS
Mechanical fall with closed C2 fracture, c-collar x 4 weeks, pain control, awaiting PT eval   Old CVA with reported worsening left hemiparesis, c/w statin/ASA, MRI brain with no acute findings Mechanical fall with closed C2 fracture, c-collar x 4 weeks, pain control, awaiting PT eval   Old CVA with reported worsening left hemiparesis, c/w statin/ASA, MRI brain with no acute findings  Borderline hypotension, discontinued ACE, orthostatics negative

## 2017-07-19 NOTE — PROGRESS NOTE ADULT - SUBJECTIVE AND OBJECTIVE BOX
Patient is a 67y old  Female who presents with a chief complaint of Mechanical fall (18 Jul 2017 13:30)        SUBJECTIVE / OVERNIGHT EVENTS:      MEDICATIONS  (STANDING):  enoxaparin Injectable 40 milliGRAM(s) SubCutaneous daily  PARoxetine 30 milliGRAM(s) Oral daily  ARIPiprazole 2 milliGRAM(s) Oral daily  busPIRone 30 milliGRAM(s) Oral two times a day  doxepin 150 milliGRAM(s) Oral daily  aspirin enteric coated 81 milliGRAM(s) Oral daily  atorvastatin 20 milliGRAM(s) Oral at bedtime    MEDICATIONS  (PRN):  oxyCODONE    IR 5 milliGRAM(s) Oral every 4 hours PRN Moderate Pain (4 - 6)  oxyCODONE    IR 10 milliGRAM(s) Oral every 6 hours PRN Severe Pain (7 - 10)  acetaminophen   Tablet. 650 milliGRAM(s) Oral every 6 hours PRN Mild Pain (1 - 3)      Vital Signs Last 24 Hrs  T(C): 36.7 (07-19-17 @ 04:37), Max: 36.8 (07-18-17 @ 20:33)  HR: 77 (07-19-17 @ 04:37) (64 - 77)  BP: 103/63 (07-19-17 @ 04:37) (91/56 - 130/83)  RR: 18 (07-19-17 @ 04:37) (18 - 18)  SpO2: 97% (07-19-17 @ 04:37) (92% - 97%)  CAPILLARY BLOOD GLUCOSE        I&O's Summary    18 Jul 2017 07:01  -  19 Jul 2017 07:00  --------------------------------------------------------  IN: 540 mL / OUT: 1900 mL / NET: -1360 mL        PHYSICAL EXAM  GENERAL: NAD, well-developed  HEAD:  Atraumatic, Normocephalic  EYES: EOMI, PERRLA, conjunctiva and sclera clear  NECK: Supple, No JVD  CHEST/LUNG: Clear to auscultation bilaterally; No wheeze  HEART: Regular rate and rhythm; No murmurs, rubs, or gallops  ABDOMEN: Soft, Nontender, Nondistended; Bowel sounds present  EXTREMITIES:  2+ Peripheral Pulses, No clubbing, cyanosis, or edema  PSYCH: AAOx3  SKIN: No rashes or lesions    LABS:                        10.6   8.25  )-----------( 401      ( 18 Jul 2017 07:50 )             32.7     07-18    137  |  102  |  12  ----------------------------<  77  3.8   |  16<L>  |  0.91    Ca    8.6      18 Jul 2017 07:50  Phos  3.1     07-18  Mg     2.3     07-18        CARDIAC MARKERS ( 18 Jul 2017 07:50 )  x     / x     / 547 U/L / x     / x      CARDIAC MARKERS ( 18 Jul 2017 00:40 )  x     / <0.01 ng/mL / x     / x     / x              RADIOLOGY & ADDITIONAL TESTS:    Imaging Personally Reviewed:  Consultant(s) Notes Reviewed:    Care Discussed with Consultants/Other Providers: Patient is a 67y old  Female who presents with a chief complaint of Mechanical fall (18 Jul 2017 13:30)        SUBJECTIVE / OVERNIGHT EVENTS: No acute events overnight. Patient has generalized upper body achiness and chin discomfort from her c-collar. She denies any chest pain, sob, nausea, or vomiting. Patient was seen and examined at bedside.      MEDICATIONS  (STANDING):  enoxaparin Injectable 40 milliGRAM(s) SubCutaneous daily  PARoxetine 30 milliGRAM(s) Oral daily  ARIPiprazole 2 milliGRAM(s) Oral daily  busPIRone 30 milliGRAM(s) Oral two times a day  doxepin 150 milliGRAM(s) Oral daily  aspirin enteric coated 81 milliGRAM(s) Oral daily  atorvastatin 20 milliGRAM(s) Oral at bedtime    MEDICATIONS  (PRN):  oxyCODONE    IR 5 milliGRAM(s) Oral every 4 hours PRN Moderate Pain (4 - 6)  oxyCODONE    IR 10 milliGRAM(s) Oral every 6 hours PRN Severe Pain (7 - 10)  acetaminophen   Tablet. 650 milliGRAM(s) Oral every 6 hours PRN Mild Pain (1 - 3)      Vital Signs Last 24 Hrs  T(C): 36.7 (07-19-17 @ 04:37), Max: 36.8 (07-18-17 @ 20:33)  HR: 77 (07-19-17 @ 04:37) (64 - 77)  BP: 103/63 (07-19-17 @ 04:37) (91/56 - 130/83)  RR: 18 (07-19-17 @ 04:37) (18 - 18)  SpO2: 97% (07-19-17 @ 04:37) (92% - 97%)  CAPILLARY BLOOD GLUCOSE        I&O's Summary    18 Jul 2017 07:01  -  19 Jul 2017 07:00  --------------------------------------------------------  IN: 540 mL / OUT: 1900 mL / NET: -1360 mL        PHYSICAL EXAM  GENERAL: NAD, well-developed  HEAD:  Atraumatic, Normocephalic  EYES: EOMI, PERRLA, conjunctiva and sclera clear, ecchymosis below right eye-   NECK: c-collar in place  CHEST/LUNG: Clear to auscultation bilaterally; No wheeze  HEART: Regular rate and rhythm; No murmurs, rubs, or gallops  ABDOMEN: Soft, Nontender, Nondistended; Bowel sounds present  EXTREMITIES:  2+ Peripheral Pulses, No clubbing, cyanosis, or edema  PSYCH: AAOx3  SKIN: No rashes or lesions    LABS:                        10.6   8.25  )-----------( 401      ( 18 Jul 2017 07:50 )             32.7     07-18    137  |  102  |  12  ----------------------------<  77  3.8   |  16<L>  |  0.91    Ca    8.6      18 Jul 2017 07:50  Phos  3.1     07-18  Mg     2.3     07-18        CARDIAC MARKERS ( 18 Jul 2017 07:50 )  x     / x     / 547 U/L / x     / x      CARDIAC MARKERS ( 18 Jul 2017 00:40 )  x     / <0.01 ng/mL / x     / x     / x              RADIOLOGY & ADDITIONAL TESTS:    Imaging Personally Reviewed:  Consultant(s) Notes Reviewed:    Care Discussed with Consultants/Other Providers: Patient is a 67y old  Female who presents with a chief complaint of Mechanical fall (18 Jul 2017 13:30)        SUBJECTIVE / OVERNIGHT EVENTS: No acute events overnight. Patient has generalized upper body achiness and chin discomfort from her c-collar. She denies any chest pain, sob, nausea, or vomiting. Patient was seen and examined at bedside.      MEDICATIONS  (STANDING):  enoxaparin Injectable 40 milliGRAM(s) SubCutaneous daily  PARoxetine 30 milliGRAM(s) Oral daily  ARIPiprazole 2 milliGRAM(s) Oral daily  busPIRone 30 milliGRAM(s) Oral two times a day  doxepin 150 milliGRAM(s) Oral daily  aspirin enteric coated 81 milliGRAM(s) Oral daily  atorvastatin 20 milliGRAM(s) Oral at bedtime    MEDICATIONS  (PRN):  oxyCODONE    IR 5 milliGRAM(s) Oral every 4 hours PRN Moderate Pain (4 - 6)  oxyCODONE    IR 10 milliGRAM(s) Oral every 6 hours PRN Severe Pain (7 - 10)  acetaminophen   Tablet. 650 milliGRAM(s) Oral every 6 hours PRN Mild Pain (1 - 3)      Vital Signs Last 24 Hrs  T(C): 36.7 (07-19-17 @ 04:37), Max: 36.8 (07-18-17 @ 20:33)  HR: 77 (07-19-17 @ 04:37) (64 - 77)  BP: 103/63 (07-19-17 @ 04:37) (91/56 - 130/83)  RR: 18 (07-19-17 @ 04:37) (18 - 18)  SpO2: 97% (07-19-17 @ 04:37) (92% - 97%)  CAPILLARY BLOOD GLUCOSE        I&O's Summary    18 Jul 2017 07:01  -  19 Jul 2017 07:00  --------------------------------------------------------  IN: 540 mL / OUT: 1900 mL / NET: -1360 mL        PHYSICAL EXAM  GENERAL: NAD, well-developed  HEAD:  Atraumatic, Normocephalic  EYES: EOMI, PERRLA, conjunctiva and sclera clear, ecchymosis below right eye-   NECK: c-collar in place  CHEST/LUNG: Clear to auscultation bilaterally; No wheeze  HEART: Regular rate and rhythm; No murmurs, rubs, or gallops  ABDOMEN: Soft, Nontender, Nondistended; Bowel sounds present  EXTREMITIES:  2+ Peripheral Pulses, No clubbing, cyanosis, or edema  NEURO: AAOx3, cranial nerves intact, strength 5/5 in all extremities.   SKIN: No rashes or lesions    LABS:                        10.6   8.25  )-----------( 401      ( 18 Jul 2017 07:50 )             32.7     07-18    137  |  102  |  12  ----------------------------<  77  3.8   |  16<L>  |  0.91    Ca    8.6      18 Jul 2017 07:50  Phos  3.1     07-18  Mg     2.3     07-18        CARDIAC MARKERS ( 18 Jul 2017 07:50 )  x     / x     / 547 U/L / x     / x      CARDIAC MARKERS ( 18 Jul 2017 00:40 )  x     / <0.01 ng/mL / x     / x     / x              RADIOLOGY & ADDITIONAL TESTS:    Imaging Personally Reviewed:  Consultant(s) Notes Reviewed:    Care Discussed with Consultants/Other Providers:

## 2017-07-19 NOTE — PHYSICAL THERAPY INITIAL EVALUATION ADULT - PERTINENT HX OF CURRENT PROBLEM, REHAB EVAL
67 year old female admitted to the hospital for a mechanical fall when tripped on a cord in home. Pt was discharged home and sustained a second fall 3 days later (7/17/17) when she fell down her steps (about 5 steps). CT on cervical spine revealed C2 left transverse process fracture. CT lumbar spine revealed no fractures or subluxations. MRI of head revealed no acute hemorrhage or infarct age-appropriate involutional changes and chronic right corona radiate infarct.

## 2017-07-19 NOTE — PHYSICAL THERAPY INITIAL EVALUATION ADULT - ACTIVE RANGE OF MOTION EXAMINATION, REHAB EVAL
bilateral  lower extremity Active ROM was WFL (within functional limits)/bilateral UE Active ROM within cervical precautions

## 2017-07-19 NOTE — PHYSICAL THERAPY INITIAL EVALUATION ADULT - CRITERIA FOR SKILLED THERAPEUTIC INTERVENTIONS
therapy frequency/anticipated discharge recommendation/risk reduction/prevention/rehab potential/impairments found/functional limitations in following categories/predicted duration of therapy intervention/anticipated equipment needs at discharge

## 2017-07-19 NOTE — PROGRESS NOTE ADULT - PROBLEM SELECTOR PLAN 3
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

## 2017-07-19 NOTE — PROGRESS NOTE ADULT - PROBLEM SELECTOR PLAN 1
- Seen on CT neck,   -Assessed by Trauma and Neurosurgery in ED  - CTA neg  -MRI brain on 7/18 for headache and dizziness-- chronic right corona radiata infarct. No acute hemorrhage or infarct.   - C-collar for 4 weeks as per neurosurgery. She should follow up as an outpatient with Dr. Mackenzie from neurosurgery.   - neuro checks Q4  - pain control  - FU PT eval

## 2017-07-19 NOTE — PROGRESS NOTE ADULT - PROBLEM SELECTOR PLAN 2
- Fall on stairs likely multifactorial given chronic L sided weakness, late hour, carrying objects, and large amount of current medications. Denies LOC.   - will monitor on tele for any arrythmia and recheck QTc, admission EKG showed QTc 472  - monitor BP for signs of hypotension and check orthostatics  -Troponins negx3 - Fall on stairs likely multifactorial given chronic L sided weakness, late hour, carrying objects, and large amount of current medications. Denies LOC.   - will monitor on tele for any arrythmia and recheck QTc, admission EKG showed QTc 472  - monitor BP for signs of hypotension and check orthostatics prn  -Troponins negx3

## 2017-07-19 NOTE — PHYSICAL THERAPY INITIAL EVALUATION ADULT - ADDITIONAL COMMENTS
As per pt and spouse, pt was completely independent before admission. Spouse indicates pt lives in a private home with 3 steps to enter, no hand rails, and has 18 steps total inside the house, handrails on R side, to reach second floor with bedroom and full bathroom. Spouse states pt was sleeping on first floor when they originally were discharged home from ED, but the first floor does not have a full bathroom with shower. Pt states she has a cane for ambulation, but admits she does not use it. (PMH cont.): X-rays of pelvic revealed no acute fracture (7/17). X-rays (7/15) revealed no acute fracture of right humerus and elbow. PMH: CVA with residual left sided weakness, depression, HTN, HLD.    As per pt and spouse, pt was completely independent before admission. Spouse indicates pt lives in a private home with 3 steps to enter, no hand rails, and has 18 steps total inside the house, handrails on R side, to reach second floor with bedroom and full bathroom. Spouse states pt was sleeping on first floor when they originally were discharged home from ED, but the first floor does not have a full bathroom with shower. Pt states she has a cane for ambulation, but admits she does not use it.

## 2017-07-19 NOTE — PROGRESS NOTE ADULT - ASSESSMENT
66 y/o F w/ PMHx of depression, CVA with residual L sided weakness, HTN and HLD, here 3 days prior 2/2 mechanical fall from tripped on a cord c/b R eye ecchymosis presents s/p 2nd fall down stairs (about 5 steps high) hitting the R side of her head found to have a C2 fracture in ED.

## 2017-07-20 LAB
ANION GAP SERPL CALC-SCNC: 14 MMOL/L — SIGNIFICANT CHANGE UP (ref 5–17)
BUN SERPL-MCNC: 9 MG/DL — SIGNIFICANT CHANGE UP (ref 7–23)
CALCIUM SERPL-MCNC: 8.9 MG/DL — SIGNIFICANT CHANGE UP (ref 8.4–10.5)
CHLORIDE SERPL-SCNC: 106 MMOL/L — SIGNIFICANT CHANGE UP (ref 96–108)
CK SERPL-CCNC: 219 U/L — HIGH (ref 25–170)
CO2 SERPL-SCNC: 20 MMOL/L — LOW (ref 22–31)
CREAT SERPL-MCNC: 0.79 MG/DL — SIGNIFICANT CHANGE UP (ref 0.5–1.3)
GLUCOSE SERPL-MCNC: 98 MG/DL — SIGNIFICANT CHANGE UP (ref 70–99)
MAGNESIUM SERPL-MCNC: 2.6 MG/DL — SIGNIFICANT CHANGE UP (ref 1.6–2.6)
PHOSPHATE SERPL-MCNC: 3 MG/DL — SIGNIFICANT CHANGE UP (ref 2.5–4.5)
POTASSIUM SERPL-MCNC: 4.6 MMOL/L — SIGNIFICANT CHANGE UP (ref 3.5–5.3)
POTASSIUM SERPL-SCNC: 4.6 MMOL/L — SIGNIFICANT CHANGE UP (ref 3.5–5.3)
SODIUM SERPL-SCNC: 140 MMOL/L — SIGNIFICANT CHANGE UP (ref 135–145)

## 2017-07-20 PROCEDURE — 99232 SBSQ HOSP IP/OBS MODERATE 35: CPT | Mod: GC

## 2017-07-20 RX ORDER — ACETAMINOPHEN 500 MG
2 TABLET ORAL
Qty: 0 | Refills: 0 | COMMUNITY
Start: 2017-07-20

## 2017-07-20 RX ORDER — ASPIRIN/CALCIUM CARB/MAGNESIUM 324 MG
1 TABLET ORAL
Qty: 0 | Refills: 0 | COMMUNITY
Start: 2017-07-20

## 2017-07-20 RX ORDER — OXYCODONE HYDROCHLORIDE 5 MG/1
1 TABLET ORAL
Qty: 0 | Refills: 0 | COMMUNITY
Start: 2017-07-20

## 2017-07-20 RX ORDER — RAMIPRIL 5 MG
1 CAPSULE ORAL
Qty: 0 | Refills: 0 | COMMUNITY

## 2017-07-20 RX ADMIN — Medication 81 MILLIGRAM(S): at 12:51

## 2017-07-20 RX ADMIN — Medication 30 MILLIGRAM(S): at 17:44

## 2017-07-20 RX ADMIN — OXYCODONE HYDROCHLORIDE 5 MILLIGRAM(S): 5 TABLET ORAL at 12:55

## 2017-07-20 RX ADMIN — Medication 150 MILLIGRAM(S): at 12:50

## 2017-07-20 RX ADMIN — Medication 30 MILLIGRAM(S): at 12:51

## 2017-07-20 RX ADMIN — ARIPIPRAZOLE 2 MILLIGRAM(S): 15 TABLET ORAL at 12:51

## 2017-07-20 RX ADMIN — OXYCODONE HYDROCHLORIDE 5 MILLIGRAM(S): 5 TABLET ORAL at 13:59

## 2017-07-20 RX ADMIN — ATORVASTATIN CALCIUM 20 MILLIGRAM(S): 80 TABLET, FILM COATED ORAL at 22:47

## 2017-07-20 RX ADMIN — ENOXAPARIN SODIUM 40 MILLIGRAM(S): 100 INJECTION SUBCUTANEOUS at 12:50

## 2017-07-20 RX ADMIN — Medication 30 MILLIGRAM(S): at 06:04

## 2017-07-20 NOTE — PROGRESS NOTE ADULT - PROBLEM SELECTOR PLAN 9
-Diet- DASH/TLC  - c/w atorvastatin for HLD

## 2017-07-20 NOTE — PROGRESS NOTE ADULT - ASSESSMENT
68 y/o F w/ PMHx of depression, CVA with residual L sided weakness, HTN and HLD, here 3 days prior 2/2 mechanical fall from tripped on a cord c/b R eye ecchymosis presents s/p 2nd fall down stairs (about 5 steps high) hitting the R side of her head found to have a stable C2 fracture now pending rehab.

## 2017-07-20 NOTE — PROGRESS NOTE ADULT - PROBLEM SELECTOR PLAN 1
- Seen on CT neck,   -Assessed by Trauma and Neurosurgery in ED  - CTA neg  -MRI brain on 7/18 for headache and dizziness-- chronic right corona radiata infarct. No acute hemorrhage or infarct.   - C-collar for 4 weeks as per neurosurgery. She should follow up as an outpatient with Dr. Mackenzie from neurosurgery.   - neuro checks Q4  - pain control  - FU PT eval - Seen on CT neck w/ CTA neg for bleed, Assessed by Trauma and Neurosurgery  -MRI brain on 7/18 for headache and dizziness-- chronic right corona radiata infarct. No acute hemorrhage or infarct.   - C-collar for 4 weeks as per neurosurgery. She should follow up as an outpatient with Dr. Sai Liang from neurosurgery.   - no neurological deficits on exam. c/w current pain regimen

## 2017-07-20 NOTE — PROGRESS NOTE ADULT - ATTENDING COMMENTS
Mechanical fall with closed C2 fracture, c-collar x 4 weeks, pain control, PT  Old CVA with reported worsening left hemiparesis, c/w statin/ASA, MRI brain with no acute findings  Borderline hypotension, discontinued ACE  Post-concussion syndrome, c/o dizziness and headaches, orthostatics negative, pain control, PT  D/C planning to rehab

## 2017-07-20 NOTE — PROGRESS NOTE ADULT - PROBLEM SELECTOR PLAN 2
- Fall on stairs likely multifactorial given chronic L sided weakness, late hour, carrying objects, and large amount of current medications. Denies LOC.   - will monitor on tele for any arrythmia and recheck QTc, admission EKG showed QTc 472  - monitor BP for signs of hypotension and check orthostatics prn  -Troponins negx3 - Fall on stairs likely multifactorial given chronic L sided weakness, late hour, carrying objects, and large amount of current medications. Denies LOC.   - PT evaluation completed and candidate for [facility]. Spoke with  and plan to prepare for d/c to ab

## 2017-07-20 NOTE — PROGRESS NOTE ADULT - PROBLEM SELECTOR PLAN 7
- hx of depression and anxiety   - c/w home meds  - Abilify, Paxil, Doxepin, and Buspirone -Diet- DASH/TLC  - c/w atorvastatin for HLD

## 2017-07-20 NOTE — PROGRESS NOTE ADULT - SUBJECTIVE AND OBJECTIVE BOX
CHIEF COMPLAINT: Patient is a 67y old  Female who presents with a chief complaint of Mechanical fall (18 Jul 2017 13:30)    Interval Events: No acute events overnight. Patient denies CP, SOB, n/v/d, or pain. Feels well and wants to know when she can go home.    REVIEW OF SYSTEMS:  Constitutional: no fever, no chills  Eyes:no eye pain, no blurry vision  ENT:no throat pain  CV: no CP, no papitations  Resp:no sob or cough  GI: no abdominal pain, no diarrhea  :  MSK:  Integumentary:  Neurological: no problems moving arms or legs, no loss of sensation of limbs  Psychiatric:  Endocrine:  Hematologic/Lymphatic:  Allergic/Immunologic:  [ x] All other systems negative  [ ] Unable to assess ROS because ________    OBJECTIVE:  ICU Vital Signs Last 24 Hrs  T(C): 36.6 (20 Jul 2017 04:30), Max: 36.8 (20 Jul 2017 00:16)  T(F): 97.9 (20 Jul 2017 04:30), Max: 98.2 (20 Jul 2017 00:16)  HR: 60 (20 Jul 2017 04:30) (60 - 71)  BP: 134/82 (20 Jul 2017 04:30) (94/66 - 136/79)  BP(mean): --  ABP: --  ABP(mean): --  RR: 18 (20 Jul 2017 04:30) (18 - 18)  SpO2: 98% (20 Jul 2017 04:30) (94% - 98%)        07-19 @ 07:01  -  07-20 @ 07:00  --------------------------------------------------------  IN: 834 mL / OUT: 1200 mL / NET: -366 mL      CAPILLARY BLOOD GLUCOSE          PHYSICAL EXAM:  General: NAD, lying in bed w/ neck collar  HEENT: PERRLA, EOMI  Neck: collar placed  Respiratory: normal work of breathing, cta b/l  Cardiovascular: s1&s2 +, rrr, no m/r/g appreciated  Abdomen: bs+, soft, nt, nd no appreciable organomegaly  Extremities: peripheral pulses 2+, no b/l LE edema  Skin: ecchymosis around right eye unchanged from previous exam, warm and dry  Neurological: A&Ox3, no focal deficits appreciated in the extremities compared to previous exam    HOSPITAL MEDICATIONS:  MEDICATIONS  (STANDING):  enoxaparin Injectable 40 milliGRAM(s) SubCutaneous daily  PARoxetine 30 milliGRAM(s) Oral daily  ARIPiprazole 2 milliGRAM(s) Oral daily  busPIRone 30 milliGRAM(s) Oral two times a day  doxepin 150 milliGRAM(s) Oral daily  aspirin enteric coated 81 milliGRAM(s) Oral daily  atorvastatin 20 milliGRAM(s) Oral at bedtime    MEDICATIONS  (PRN):  oxyCODONE    IR 5 milliGRAM(s) Oral every 4 hours PRN Moderate Pain (4 - 6)  oxyCODONE    IR 10 milliGRAM(s) Oral every 6 hours PRN Severe Pain (7 - 10)  acetaminophen   Tablet. 650 milliGRAM(s) Oral every 6 hours PRN Mild Pain (1 - 3)      LABS:  (07-19 @ 07:20)                        10.8  7.73 )-----------( 387                 33.6    Neutrophils = -- (--%)  Lymphocytes = -- (--%)  Eosinophils = -- (--%)  Basophils = -- (--%)  Monocytes = -- (--%)  Bands = --%    WBC Trend: 7.73<--, 8.25<--, 11.5<--  Hb Trend: 10.8<--, 10.6<--, 11.3<--, 12.7<--  Plt Trend: 387<--, 401<--, 322<--, 289<--  07-20    140  |  106  |  9   ----------------------------<  98  4.6   |  20<L>  |  0.79    Ca    8.9      20 Jul 2017 06:13  Phos  3.0     07-20  Mg     2.6     07-20      Creatinine Trend: 0.79<--, 0.79<--, 0.91<--, 1.02<--, 1.55<--          CARDIAC MARKERS ( 20 Jul 2017 06:13 )  Trop x     /  U/L<H> / CKMB x       CARDIAC MARKERS ( 19 Jul 2017 07:23 )  Trop x     /  U/L<H> / CKMB x

## 2017-07-20 NOTE — PROGRESS NOTE ADULT - PROBLEM SELECTOR PLAN 4
- borderline low BP, hold ACE - continue to hold ace-i given relative hypotension. Patient will need to be reevaluated at rehab/outpatient to restart

## 2017-07-20 NOTE — CHART NOTE - NSCHARTNOTEFT_GEN_A_CORE
TERTIARY TRAUMA SURVEY  ------------------------------------------------------------------------------------    Date of TTS:   Time:   Admit Date:   Trauma Activation:   Admit GCS: E-     V-     M-     HPI:  68 y/o F w/ PMHx of depression, CVA with residual L sided weakness, HTN and HLD, here 3 days prior 2/2 mechanical fall from tripped on a cord c/b R eye ecchymosis presents s/p 2nd fall down stairs (about 5 steps high) hitting the R side of her head found to have a C2 fracture in ED.   Patient reports having a fall about 3 days ago. Patient reports waking up in the middle of the night to go to the bathroom. They have been remodeling their house, so on the way to the bathroom in the dark, she tripped on a power cord on the floor. She landed on her R side and hit her face causing R eye ecchymosis. She went to the ED and they did a CT head and R sided xrays showing no acute fractures.   Last night, patient reports falling asleep downstairs and then waking up around 3am and heading upstairs. On her way up, she was carrying a box and several papers and fell down after ascending about 5 steps. She doesn't remember the events too well, but thinks she might have been a little dizzy when she fell and feeling very tired.   Denies any CP, palpations, SOB, F/C, N/V, syncope, or URI symptoms.     In ED,   Vitals T 97.7, HR 64, /75, RR 18, SpO2 94 on RA   Labs- WBC 11.5, K 3.2, Hgb 11.3,   Given KCl, 1 gr IV tylenol, Morphine 4mg IV, 500cc NS (18 Jul 2017 00:46)      INTERVAL EVENTS: ***    PAST MEDICAL & SURGICAL HISTORY:  Hypertension  Depression  Anxiety  History of appendectomy    [] No significant past history as reviewed with the patient and family    FAMILY HISTORY:  No pertinent family history in first degree relatives    [] Family history not pertinent as reviewed with the patient and family    SOCIAL HISTORY: ***    ALLERGIES: No Known Allergies      HOME MEDICATIONS: ***    CURRENT MEDICATIONS  MEDICATIONS (STANDING): enoxaparin Injectable 40 milliGRAM(s) SubCutaneous daily  PARoxetine 30 milliGRAM(s) Oral daily  ARIPiprazole 2 milliGRAM(s) Oral daily  busPIRone 30 milliGRAM(s) Oral two times a day  doxepin 150 milliGRAM(s) Oral daily  aspirin enteric coated 81 milliGRAM(s) Oral daily  atorvastatin 20 milliGRAM(s) Oral at bedtime    MEDICATIONS (PRN):oxyCODONE    IR 5 milliGRAM(s) Oral every 4 hours PRN Moderate Pain (4 - 6)  oxyCODONE    IR 10 milliGRAM(s) Oral every 6 hours PRN Severe Pain (7 - 10)  acetaminophen   Tablet. 650 milliGRAM(s) Oral every 6 hours PRN Mild Pain (1 - 3)    -----------------------------------------------------------------------------------    VITAL SIGNS:  T(C): 36.8 (07-20-17 @ 12:49), Max: 36.8 (07-20-17 @ 00:16)  HR: 64 (07-20-17 @ 12:49) (60 - 71)  BP: 144/84 (07-20-17 @ 12:49) (101/67 - 144/84)  BP(mean): --  RR: 17 (07-20-17 @ 12:49) (17 - 18)  SpO2: 98% (07-20-17 @ 12:49) (96% - 98%)  Wt(kg): --  CAPILLARY BLOOD GLUCOSE        Drug Dosing Weight  Height (cm): 167.64 (18 Jul 2017 05:35)  Weight (kg): 78 (18 Jul 2017 05:35)  BMI (kg/m2): 27.8 (18 Jul 2017 05:35)  BSA (m2): 1.88 (18 Jul 2017 05:35)    07-19 @ 07:01  -  07-20 @ 07:00  --------------------------------------------------------  IN:    Oral Fluid: 834 mL  Total IN: 834 mL    OUT:    Voided: 1200 mL  Total OUT: 1200 mL    Total NET: -366 mL      07-20 @ 07:01  -  07-20 @ 14:50  --------------------------------------------------------  IN:    Oral Fluid: 600 mL  Total IN: 600 mL    OUT:    Voided: 260 mL  Total OUT: 260 mL    Total NET: 340 mL          PHYSICAL EXAM:  ***  General: NAD, Sitting in bed comfortably, not irratable   HEENT: NC/AT, EOMI  Neck: Soft, supple  Cardio: RRR, nml S1/S2  Resp: Good effort, CTA b/l  Thorax: No chest wall tenderness  Breast: No lesions/masses, no drainage  GI/Abd: Soft, NT/ND, no rebound/guarding, no masses palpated  Vascular: Extremities warm, brisk cap refill, B/l radial pulses palpable, b/l DP/PT palpable, no palpable abdominal pulsatile mass  Skin: Intact, no breakdown  Lymphatic/Nodes: No palpable lymphadenopathy  Musculoskeletal: All 4 extremities moving spontaneously, no limitations    LABS:  CBC (07-19 @ 07:20)                              10.8<L>                         7.73    )----------------(  387        --    % Neutrophils, --    % Lymphocytes, ANC: --                                  33.6<L>    BMP (07-20 @ 06:13)             140     |  106     |  9     		Ca++ --      Ca 8.9                ---------------------------------( 98    		Mg 2.6                4.6     |  20<L>   |  0.79  			Ph 3.0     BMP (07-19 @ 07:23)             143     |  108     |  10    		Ca++ --      Ca 8.6                ---------------------------------( 95    		Mg 2.6                3.9     |  20<L>   |  0.79  			Ph 2.7           Cardiac Markers (07-20 @ 06:13)     Trop: -- -- / CKMB: -- / CK: 219  Cardiac Markers (07-19 @ 07:23)     Trop: -- -- / CKMB: -- / CK: 342          MICROBIOLOGY:        ------------------------------------------------------------------------------------------  RADIOLOGICAL FINDINGS REVIEW:  ***  CXR:   Pelvis Films:    C-Spine Films:   T/L/S Spine Films:   Extremity Films:   Head CT:   C-Spine CT:   Neck CT:   Chest CT:   ABD/Pelvis CT:   Other:     List Injuries Identified to Date:  ***  Hypokalemia: Hypokalemia  Traumatic rhabdomyolysis, initial encounter: Traumatic rhabdomyolysis, initial encounter  Prerenal azotemia: Prerenal azotemia  CVA, old, hemiparesis: CVA, old, hemiparesis  Nutrition, metabolism, and development symptoms: Nutrition, metabolism, and development symptoms  Prophylactic measure: Prophylactic measure  Depression, unspecified depression type: Depression, unspecified depression type  Essential hypertension: Essential hypertension  Closed nondisplaced fracture of second cervical vertebra, unspecified fracture morphology, initial encounter: Closed nondisplaced fracture of second cervical vertebra, unspecified fracture morphology, initial encounter  Fall, initial encounter: Fall, initial encounter      List Operative and Interventional Radiological Procedures: ***      Consults (Date):  [] Neurosurgery   [] Orthopedic Surgery  [] Spine Surgery  [] Plastic Surgery  [] ENT  [] Urology  [] PM&R  [] Social Work    INTERPRETATION/ASSESSMENT:   67y female     PLAN:   -   - TERTIARY TRAUMA SURVEY  ------------------------------------------------------------------------------------    Date of TTS:   Time:   Admit Date:   Trauma Activation:   Admit GCS: E-     V-     M-     HPI:  66 y/o F w/ PMHx of depression, CVA with residual L sided weakness, HTN and HLD, here 3 days prior 2/2 mechanical fall from tripped on a cord c/b R eye ecchymosis presents s/p 2nd fall down stairs (about 5 steps high) hitting the R side of her head found to have a C2 fracture in ED.   Patient reports having a fall about 3 days ago. Patient reports waking up in the middle of the night to go to the bathroom. They have been remodeling their house, so on the way to the bathroom in the dark, she tripped on a power cord on the floor. She landed on her R side and hit her face causing R eye ecchymosis. She went to the ED and they did a CT head and R sided xrays showing no acute fractures.   Last night, patient reports falling asleep downstairs and then waking up around 3am and heading upstairs. On her way up, she was carrying a box and several papers and fell down after ascending about 5 steps. She doesn't remember the events too well, but thinks she might have been a little dizzy when she fell and feeling very tired.   Denies any CP, palpations, SOB, F/C, N/V, syncope, or URI symptoms.     In ED,   Vitals T 97.7, HR 64, /75, RR 18, SpO2 94 on RA   Labs- WBC 11.5, K 3.2, Hgb 11.3,   Given KCl, 1 gr IV tylenol, Morphine 4mg IV, 500cc NS (18 Jul 2017 00:46)      INTERVAL EVENTS: No acute events. Patient is doing well, just concerned about the bruise on the right side of her face enlarging and changing colors. She is also complaining about dizziness when standing.    PAST MEDICAL & SURGICAL HISTORY:  Hypertension  Depression  Anxiety  History of appendectomy      FAMILY HISTORY:  No pertinent family history in first degree relatives    ALLERGIES: No Known Allergies    CURRENT MEDICATIONS  MEDICATIONS (STANDING): enoxaparin Injectable 40 milliGRAM(s) SubCutaneous daily  PARoxetine 30 milliGRAM(s) Oral daily  ARIPiprazole 2 milliGRAM(s) Oral daily  busPIRone 30 milliGRAM(s) Oral two times a day  doxepin 150 milliGRAM(s) Oral daily  aspirin enteric coated 81 milliGRAM(s) Oral daily  atorvastatin 20 milliGRAM(s) Oral at bedtime    MEDICATIONS (PRN):oxyCODONE    IR 5 milliGRAM(s) Oral every 4 hours PRN Moderate Pain (4 - 6)  oxyCODONE    IR 10 milliGRAM(s) Oral every 6 hours PRN Severe Pain (7 - 10)  acetaminophen   Tablet. 650 milliGRAM(s) Oral every 6 hours PRN Mild Pain (1 - 3)    -----------------------------------------------------------------------------------    VITAL SIGNS:  T(C): 36.8 (07-20-17 @ 12:49), Max: 36.8 (07-20-17 @ 00:16)  HR: 64 (07-20-17 @ 12:49) (60 - 71)  BP: 144/84 (07-20-17 @ 12:49) (101/67 - 144/84)  BP(mean): --  RR: 17 (07-20-17 @ 12:49) (17 - 18)  SpO2: 98% (07-20-17 @ 12:49) (96% - 98%)  Wt(kg): --  CAPILLARY BLOOD GLUCOSE        Drug Dosing Weight  Height (cm): 167.64 (18 Jul 2017 05:35)  Weight (kg): 78 (18 Jul 2017 05:35)  BMI (kg/m2): 27.8 (18 Jul 2017 05:35)  BSA (m2): 1.88 (18 Jul 2017 05:35)    07-19 @ 07:01  -  07-20 @ 07:00  --------------------------------------------------------  IN:    Oral Fluid: 834 mL  Total IN: 834 mL    OUT:    Voided: 1200 mL  Total OUT: 1200 mL    Total NET: -366 mL      07-20 @ 07:01 - 07-20 @ 14:50  --------------------------------------------------------  IN:    Oral Fluid: 600 mL  Total IN: 600 mL    OUT:    Voided: 260 mL  Total OUT: 260 mL    Total NET: 340 mL          PHYSICAL EXAM:   General: NAD, Resting in bed comfortably, not irritable   HEENT: small elevated area on the parietal bone about 4-6 cm from midline, EOMI  Face: Ecchymosis extending from zygomatic bone to hairline on R side of face.  Neck: C-collar on  Neuro: CN II-XII grossly intact  Cardio: RRR, nml S1/S2  Resp: Good effort, CTA b/l  Thorax: No chest wall tenderness  GI/Abd: Soft, NT/ND, no rebound/guarding, no masses palpated  Extremities: warm, brisk cap refill, tenderness over Right upper and lower arm, no tenderness on Left arm or lower extremities.  Skin: Intact, no breakdown  Musculoskeletal: All 4 extremities moving spontaneously, no limitations  Back: No midline tenderness    LABS:  CBC (07-19 @ 07:20)                              10.8<L>                         7.73    )----------------(  387        --    % Neutrophils, --    % Lymphocytes, ANC: --                                  33.6<L>    BMP (07-20 @ 06:13)             140     |  106     |  9     		Ca++ --      Ca 8.9                ---------------------------------( 98    		Mg 2.6                4.6     |  20<L>   |  0.79  			Ph 3.0     BMP (07-19 @ 07:23)             143     |  108     |  10    		Ca++ --      Ca 8.6                ---------------------------------( 95    		Mg 2.6                3.9     |  20<L>   |  0.79  			Ph 2.7           Cardiac Markers (07-20 @ 06:13)     Trop: -- -- / CKMB: -- / CK: 219  Cardiac Markers (07-19 @ 07:23)     Trop: -- -- / CKMB: -- / CK: 342      ------------------------------------------------------------------------------------------  RADIOLOGICAL FINDINGS REVIEW:  ***  CXR:   Pelvis Films:    C-Spine Films:   T/L/S Spine Films:   Extremity Films:   Head CT:   C-Spine CT:   Neck CT:   Chest CT:   ABD/Pelvis CT:   Other:     List Injuries Identified to Date:    Hypokalemia: Hypokalemia  Traumatic rhabdomyolysis, initial encounter: Traumatic rhabdomyolysis, initial encounter  Prerenal azotemia: Prerenal azotemia  CVA, old, hemiparesis: CVA, old, hemiparesis  Nutrition, metabolism, and development symptoms: Nutrition, metabolism, and development symptoms  Prophylactic measure: Prophylactic measure  Depression, unspecified depression type: Depression, unspecified depression type  Essential hypertension: Essential hypertension  Closed nondisplaced fracture of second cervical vertebra, unspecified fracture morphology, initial encounter: Closed nondisplaced fracture of second cervical vertebra, unspecified fracture morphology, initial encounter  Fall, initial encounter: Fall, initial encounter      List Operative and Interventional Radiological Procedures: ***      Consults (Date):  [] Neurosurgery   [] Orthopedic Surgery  [] Spine Surgery  [] Plastic Surgery  [] ENT  [] Urology  [] PM&R  [] Social Work    INTERPRETATION/ASSESSMENT:   67y female     PLAN:   -   - TERTIARY TRAUMA SURVEY  ------------------------------------------------------------------------------------    Date of TTS:   Time:   Admit Date:   Trauma Activation:   Admit GCS: E-4     V-     M-     HPI:  66 y/o F w/ PMHx of depression, CVA with residual L sided weakness, HTN and HLD, here 3 days prior 2/2 mechanical fall from tripped on a cord c/b R eye ecchymosis presents s/p 2nd fall down stairs (about 5 steps high) hitting the R side of her head found to have a C2 fracture in ED.   Patient reports having a fall about 3 days ago. Patient reports waking up in the middle of the night to go to the bathroom. They have been remodeling their house, so on the way to the bathroom in the dark, she tripped on a power cord on the floor. She landed on her R side and hit her face causing R eye ecchymosis. She went to the ED and they did a CT head and R sided xrays showing no acute fractures.   Last night, patient reports falling asleep downstairs and then waking up around 3am and heading upstairs. On her way up, she was carrying a box and several papers and fell down after ascending about 5 steps. She doesn't remember the events too well, but thinks she might have been a little dizzy when she fell and feeling very tired.   Denies any CP, palpations, SOB, F/C, N/V, syncope, or URI symptoms.     In ED,   Vitals T 97.7, HR 64, /75, RR 18, SpO2 94 on RA   Labs- WBC 11.5, K 3.2, Hgb 11.3,   Given KCl, 1 gr IV tylenol, Morphine 4mg IV, 500cc NS (18 Jul 2017 00:46)      INTERVAL EVENTS: No acute events. Patient is doing well, just concerned about the bruise on the right side of her face enlarging and changing colors. She is also complaining about dizziness when standing.    PAST MEDICAL & SURGICAL HISTORY:  Hypertension  Depression  Anxiety  History of appendectomy      FAMILY HISTORY:  No pertinent family history in first degree relatives    ALLERGIES: No Known Allergies    CURRENT MEDICATIONS  MEDICATIONS (STANDING): enoxaparin Injectable 40 milliGRAM(s) SubCutaneous daily  PARoxetine 30 milliGRAM(s) Oral daily  ARIPiprazole 2 milliGRAM(s) Oral daily  busPIRone 30 milliGRAM(s) Oral two times a day  doxepin 150 milliGRAM(s) Oral daily  aspirin enteric coated 81 milliGRAM(s) Oral daily  atorvastatin 20 milliGRAM(s) Oral at bedtime    MEDICATIONS (PRN):oxyCODONE    IR 5 milliGRAM(s) Oral every 4 hours PRN Moderate Pain (4 - 6)  oxyCODONE    IR 10 milliGRAM(s) Oral every 6 hours PRN Severe Pain (7 - 10)  acetaminophen   Tablet. 650 milliGRAM(s) Oral every 6 hours PRN Mild Pain (1 - 3)    -----------------------------------------------------------------------------------    VITAL SIGNS:  T(C): 36.8 (07-20-17 @ 12:49), Max: 36.8 (07-20-17 @ 00:16)  HR: 64 (07-20-17 @ 12:49) (60 - 71)  BP: 144/84 (07-20-17 @ 12:49) (101/67 - 144/84)  BP(mean): --  RR: 17 (07-20-17 @ 12:49) (17 - 18)  SpO2: 98% (07-20-17 @ 12:49) (96% - 98%)  Wt(kg): --  CAPILLARY BLOOD GLUCOSE        Drug Dosing Weight  Height (cm): 167.64 (18 Jul 2017 05:35)  Weight (kg): 78 (18 Jul 2017 05:35)  BMI (kg/m2): 27.8 (18 Jul 2017 05:35)  BSA (m2): 1.88 (18 Jul 2017 05:35)    07-19 @ 07:01  -  07-20 @ 07:00  --------------------------------------------------------  IN:    Oral Fluid: 834 mL  Total IN: 834 mL    OUT:    Voided: 1200 mL  Total OUT: 1200 mL    Total NET: -366 mL      07-20 @ 07:01  -  07-20 @ 14:50  --------------------------------------------------------  IN:    Oral Fluid: 600 mL  Total IN: 600 mL    OUT:    Voided: 260 mL  Total OUT: 260 mL    Total NET: 340 mL          PHYSICAL EXAM:   General: NAD, Resting in bed comfortably, not irritable   HEENT: small elevated area on the parietal bone about 4-6 cm from midline, EOMI  Face: Ecchymosis extending from zygomatic bone to hairline on R side of face.  Neck: C-collar on  Neuro: CN II-XII grossly intact  Cardio: RRR, nml S1/S2  Resp: Good effort, CTA b/l  Thorax: No chest wall tenderness  GI/Abd: Soft, NT/ND, no rebound/guarding, no masses palpated  Extremities: warm, brisk cap refill, tenderness over Right upper and lower arm, no tenderness on Left arm or lower extremities.  Skin: Intact, no breakdown  Musculoskeletal: All 4 extremities moving spontaneously, no limitations  Back: No midline tenderness    LABS:  CBC (07-19 @ 07:20)                              10.8<L>                         7.73    )----------------(  387        --    % Neutrophils, --    % Lymphocytes, ANC: --                                  33.6<L>    BMP (07-20 @ 06:13)             140     |  106     |  9     		Ca++ --      Ca 8.9                ---------------------------------( 98    		Mg 2.6                4.6     |  20<L>   |  0.79  			Ph 3.0     BMP (07-19 @ 07:23)             143     |  108     |  10    		Ca++ --      Ca 8.6                ---------------------------------( 95    		Mg 2.6                3.9     |  20<L>   |  0.79  			Ph 2.7           Cardiac Markers (07-20 @ 06:13)     Trop: -- -- / CKMB: -- / CK: 219  Cardiac Markers (07-19 @ 07:23)     Trop: -- -- / CKMB: -- / CK: 342      ------------------------------------------------------------------------------------------  RADIOLOGICAL FINDINGS REVIEW:  ***  CXR:   Pelvis Films:    C-Spine Films:   T/L/S Spine Films:   Extremity Films:   Head CT:   C-Spine CT:   Neck CT:   Chest CT:   ABD/Pelvis CT:   Other:     List Injuries Identified to Date:    Hypokalemia: Hypokalemia  Traumatic rhabdomyolysis, initial encounter: Traumatic rhabdomyolysis, initial encounter  Prerenal azotemia: Prerenal azotemia  CVA, old, hemiparesis: CVA, old, hemiparesis  Nutrition, metabolism, and development symptoms: Nutrition, metabolism, and development symptoms  Prophylactic measure: Prophylactic measure  Depression, unspecified depression type: Depression, unspecified depression type  Essential hypertension: Essential hypertension  Closed nondisplaced fracture of second cervical vertebra, unspecified fracture morphology, initial encounter: Closed nondisplaced fracture of second cervical vertebra, unspecified fracture morphology, initial encounter  Fall, initial encounter: Fall, initial encounter      List Operative and Interventional Radiological Procedures: ***      Consults (Date):  [] Neurosurgery   [] Orthopedic Surgery  [] Spine Surgery  [] Plastic Surgery  [] ENT  [] Urology  [] PM&R  [] Social Work    INTERPRETATION/ASSESSMENT:   67y female     PLAN:   -   - TERTIARY TRAUMA SURVEY  ------------------------------------------------------------------------------------    Date of TTS: 7/20/17  Time: 15:00  Admit Date: 7/18/17  Admit GCS: E-4     V-5     M-6     HPI:  66 y/o F w/ PMHx of depression, CVA with residual L sided weakness, HTN and HLD, here 3 days prior 2/2 mechanical fall from tripped on a cord c/b R eye ecchymosis presents s/p 2nd fall down stairs (about 5 steps high) hitting the R side of her head found to have a C2 fracture in ED.   Patient reports having a fall about 3 days ago. Patient reports waking up in the middle of the night to go to the bathroom. They have been remodeling their house, so on the way to the bathroom in the dark, she tripped on a power cord on the floor. She landed on her R side and hit her face causing R eye ecchymosis. She went to the ED and they did a CT head and R sided xrays showing no acute fractures.   Last night, patient reports falling asleep downstairs and then waking up around 3am and heading upstairs. On her way up, she was carrying a box and several papers and fell down after ascending about 5 steps. She doesn't remember the events too well, but thinks she might have been a little dizzy when she fell and feeling very tired.   Denies any CP, palpations, SOB, F/C, N/V, syncope, or URI symptoms.     In ED,   Vitals T 97.7, HR 64, /75, RR 18, SpO2 94 on RA   Labs- WBC 11.5, K 3.2, Hgb 11.3,   Given KCl, 1 gr IV tylenol, Morphine 4mg IV, 500cc NS (18 Jul 2017 00:46)      INTERVAL EVENTS: No acute events. Patient is doing well, just concerned about the bruise on the right side of her face enlarging and changing colors. She is also complaining about dizziness when standing.    PAST MEDICAL & SURGICAL HISTORY:  Hypertension  Depression  Anxiety  History of appendectomy      FAMILY HISTORY:  No pertinent family history in first degree relatives    ALLERGIES: No Known Allergies    CURRENT MEDICATIONS  MEDICATIONS (STANDING): enoxaparin Injectable 40 milliGRAM(s) SubCutaneous daily  PARoxetine 30 milliGRAM(s) Oral daily  ARIPiprazole 2 milliGRAM(s) Oral daily  busPIRone 30 milliGRAM(s) Oral two times a day  doxepin 150 milliGRAM(s) Oral daily  aspirin enteric coated 81 milliGRAM(s) Oral daily  atorvastatin 20 milliGRAM(s) Oral at bedtime    MEDICATIONS (PRN):oxyCODONE    IR 5 milliGRAM(s) Oral every 4 hours PRN Moderate Pain (4 - 6)  oxyCODONE    IR 10 milliGRAM(s) Oral every 6 hours PRN Severe Pain (7 - 10)  acetaminophen   Tablet. 650 milliGRAM(s) Oral every 6 hours PRN Mild Pain (1 - 3)    -----------------------------------------------------------------------------------    VITAL SIGNS:  T(C): 36.8 (07-20-17 @ 12:49), Max: 36.8 (07-20-17 @ 00:16)  HR: 64 (07-20-17 @ 12:49) (60 - 71)  BP: 144/84 (07-20-17 @ 12:49) (101/67 - 144/84)  BP(mean): --  RR: 17 (07-20-17 @ 12:49) (17 - 18)  SpO2: 98% (07-20-17 @ 12:49) (96% - 98%)  Wt(kg): --  CAPILLARY BLOOD GLUCOSE        Drug Dosing Weight  Height (cm): 167.64 (18 Jul 2017 05:35)  Weight (kg): 78 (18 Jul 2017 05:35)  BMI (kg/m2): 27.8 (18 Jul 2017 05:35)  BSA (m2): 1.88 (18 Jul 2017 05:35)    07-19 @ 07:01  -  07-20 @ 07:00  --------------------------------------------------------  IN:    Oral Fluid: 834 mL  Total IN: 834 mL    OUT:    Voided: 1200 mL  Total OUT: 1200 mL    Total NET: -366 mL      07-20 @ 07:01  -  07-20 @ 14:50  --------------------------------------------------------  IN:    Oral Fluid: 600 mL  Total IN: 600 mL    OUT:    Voided: 260 mL  Total OUT: 260 mL    Total NET: 340 mL          PHYSICAL EXAM:   General: NAD, Resting in bed comfortably, not irritable   HEENT: small elevated area on the parietal bone about 4-6 cm from midline, EOMI  Face: Ecchymosis extending from zygomatic bone to hairline on R side of face.  Neck: C-collar on  Neuro: CN II-XII grossly intact  Cardio: RRR, nml S1/S2  Resp: Good effort, CTA b/l  Thorax: No chest wall tenderness  GI/Abd: Soft, NT/ND, no rebound/guarding, no masses palpated  Extremities: warm, brisk cap refill, tenderness over Right upper and lower arm, no tenderness on Left arm or lower extremities.  Skin: Intact, no breakdown  Musculoskeletal: All 4 extremities moving spontaneously, no limitations  Back: No midline tenderness    LABS:  CBC (07-19 @ 07:20)                              10.8<L>                         7.73    )----------------(  387        --    % Neutrophils, --    % Lymphocytes, ANC: --                                  33.6<L>    BMP (07-20 @ 06:13)             140     |  106     |  9     		Ca++ --      Ca 8.9                ---------------------------------( 98    		Mg 2.6                4.6     |  20<L>   |  0.79  			Ph 3.0     BMP (07-19 @ 07:23)             143     |  108     |  10    		Ca++ --      Ca 8.6                ---------------------------------( 95    		Mg 2.6                3.9     |  20<L>   |  0.79  			Ph 2.7           Cardiac Markers (07-20 @ 06:13)     Trop: -- -- / CKMB: -- / CK: 219  Cardiac Markers (07-19 @ 07:23)     Trop: -- -- / CKMB: -- / CK: 342      ------------------------------------------------------------------------------------------  RADIOLOGICAL FINDINGS REVIEW:      CT CERVICAL SPINE    IMPRESSION:  C2 left transverse process fracture extending through the   transverse foramen with a mildly depressed superior fracture fragment   impinging upon the transverse foramen. CT angiography or MR angiography   is advised to exclude injury to the vertebral artery.    Small ossific fragment within the C2-C3 left neural foramen likely   arising from the C2 inferior articulating facet.     CT LUMBAR SPINE  FINDINGS:    VERTEBRAL BODIES AND DISCS:  The vertebral bodies are normal in height.   Discogenic endplate changes are seen at the L1-L2 through L3-L4 levels   with marginal osteophyte formation. No fracture is seen.  ALIGNMENT:  There is scoliosis of the lumbar spine convex to the left   with the apex at the L2 level.  L1-L2 LEVEL:  No significant disc bulge or focal disc herniation.  L2-L3 LEVEL:  No significant disc bulge or focal disc herniation.  L3-L4 LEVEL:  No significant disc bulge or focal disc herniation. Mild   facet arthrosis  L4-L5 LEVEL:  No significant disc bulge or focal disc herniation.  L5-S1 LEVEL:  No significant disc bulge or focal disc herniation. Mild   facet arthrosis.  SPINAL CANAL:  No other soft tissue defect identified.   MISCELLANEOUS:  None.    IMPRESSION:  No fracture or subluxation.    Multilevel spondylosis.    Scoliosis convex to the left.      CT CHEST ABDOMEN PELVIS  IMPRESSION: No thoracic, abdominal, or pelvic sequela of trauma.      List Injuries Identified to Date:  C2 left transverse process fracture  Hypokalemia: Hypokalemia  Traumatic rhabdomyolysis, initial encounter: Traumatic rhabdomyolysis, initial encounter  Prerenal azotemia: Prerenal azotemia  CVA, old, hemiparesis: CVA, old, hemiparesis  Nutrition, metabolism, and development symptoms: Nutrition, metabolism, and development symptoms  Prophylactic measure: Prophylactic measure  Depression, unspecified depression type: Depression, unspecified depression type  Essential hypertension: Essential hypertension  Closed nondisplaced fracture of second cervical vertebra, unspecified fracture morphology, initial encounter: Closed nondisplaced fracture of second cervical vertebra, unspecified fracture morphology, initial encounter  Fall, initial encounter: Fall, initial encounter      List Operative and Interventional Radiological Procedures: None      Consults (Date):  [x] Neurosurgery (7/18/17)  [] Orthopedic Surgery  [] Spine Surgery  [] Plastic Surgery  [] ENT  [] Urology  [] PM&R  [] Social Work    INTERPRETATION/ASSESSMENT:   67y female     PLAN:   - Suggest to primary team to x-ray her Right humerus and R radius and ulna  - Suggest to primary team to check orthostatic blood pressure TERTIARY TRAUMA SURVEY  ------------------------------------------------------------------------------------    Date of TTS: 7/20/17  Time: 15:00  Admit Date: 7/18/17  Admit GCS: E-4     V-5     M-6     HPI:  68 y/o F w/ PMHx of depression, CVA with residual L sided weakness, HTN and HLD, here 3 days prior 2/2 mechanical fall from tripped on a cord c/b R eye ecchymosis presents s/p 2nd fall down stairs (about 5 steps high) hitting the R side of her head found to have a C2 fracture in ED.   Patient reports having a fall about 3 days ago. Patient reports waking up in the middle of the night to go to the bathroom. They have been remodeling their house, so on the way to the bathroom in the dark, she tripped on a power cord on the floor. She landed on her R side and hit her face causing R eye ecchymosis. She went to the ED and they did a CT head and R sided xrays showing no acute fractures.   Last night, patient reports falling asleep downstairs and then waking up around 3am and heading upstairs. On her way up, she was carrying a box and several papers and fell down after ascending about 5 steps. She doesn't remember the events too well, but thinks she might have been a little dizzy when she fell and feeling very tired.   Denies any CP, palpations, SOB, F/C, N/V, syncope, or URI symptoms.     In ED,   Vitals T 97.7, HR 64, /75, RR 18, SpO2 94 on RA   Labs- WBC 11.5, K 3.2, Hgb 11.3,   Given KCl, 1 gr IV tylenol, Morphine 4mg IV, 500cc NS (18 Jul 2017 00:46)      INTERVAL EVENTS: No acute events. Patient is doing well, just concerned about the bruise on the right side of her face enlarging and changing colors. She is also complaining about dizziness when standing.    PAST MEDICAL & SURGICAL HISTORY:  Hypertension  Depression  Anxiety  History of appendectomy      FAMILY HISTORY:  No pertinent family history in first degree relatives    ALLERGIES: No Known Allergies    CURRENT MEDICATIONS  MEDICATIONS (STANDING): enoxaparin Injectable 40 milliGRAM(s) SubCutaneous daily  PARoxetine 30 milliGRAM(s) Oral daily  ARIPiprazole 2 milliGRAM(s) Oral daily  busPIRone 30 milliGRAM(s) Oral two times a day  doxepin 150 milliGRAM(s) Oral daily  aspirin enteric coated 81 milliGRAM(s) Oral daily  atorvastatin 20 milliGRAM(s) Oral at bedtime    MEDICATIONS (PRN):oxyCODONE    IR 5 milliGRAM(s) Oral every 4 hours PRN Moderate Pain (4 - 6)  oxyCODONE    IR 10 milliGRAM(s) Oral every 6 hours PRN Severe Pain (7 - 10)  acetaminophen   Tablet. 650 milliGRAM(s) Oral every 6 hours PRN Mild Pain (1 - 3)    -----------------------------------------------------------------------------------    VITAL SIGNS:  T(C): 36.8 (07-20-17 @ 12:49), Max: 36.8 (07-20-17 @ 00:16)  HR: 64 (07-20-17 @ 12:49) (60 - 71)  BP: 144/84 (07-20-17 @ 12:49) (101/67 - 144/84)  BP(mean): --  RR: 17 (07-20-17 @ 12:49) (17 - 18)  SpO2: 98% (07-20-17 @ 12:49) (96% - 98%)  Wt(kg): --  CAPILLARY BLOOD GLUCOSE        Drug Dosing Weight  Height (cm): 167.64 (18 Jul 2017 05:35)  Weight (kg): 78 (18 Jul 2017 05:35)  BMI (kg/m2): 27.8 (18 Jul 2017 05:35)  BSA (m2): 1.88 (18 Jul 2017 05:35)    07-19 @ 07:01  -  07-20 @ 07:00  --------------------------------------------------------  IN:    Oral Fluid: 834 mL  Total IN: 834 mL    OUT:    Voided: 1200 mL  Total OUT: 1200 mL    Total NET: -366 mL      07-20 @ 07:01  -  07-20 @ 14:50  --------------------------------------------------------  IN:    Oral Fluid: 600 mL  Total IN: 600 mL    OUT:    Voided: 260 mL  Total OUT: 260 mL    Total NET: 340 mL          PHYSICAL EXAM:   General: NAD, Resting in bed comfortably, not irritable   HEENT: small elevated area on the parietal bone about 4-6 cm from midline, EOMI  Face: Ecchymosis extending from zygomatic bone to hairline on R side of face.  Neck: C-collar on  Neuro: CN II-XII grossly intact  Cardio: RRR, nml S1/S2  Resp: Good effort, CTA b/l  Thorax: No chest wall tenderness  GI/Abd: Soft, NT/ND, no rebound/guarding, no masses palpated  Extremities: warm, brisk cap refill, tenderness over Right upper and lower arm, no tenderness on Left arm or lower extremities.  Skin: Intact, no breakdown  Musculoskeletal: All 4 extremities moving spontaneously, no limitations  Back: No midline tenderness    LABS:  CBC (07-19 @ 07:20)                              10.8<L>                         7.73    )----------------(  387        --    % Neutrophils, --    % Lymphocytes, ANC: --                                  33.6<L>    BMP (07-20 @ 06:13)             140     |  106     |  9     		Ca++ --      Ca 8.9                ---------------------------------( 98    		Mg 2.6                4.6     |  20<L>   |  0.79  			Ph 3.0     BMP (07-19 @ 07:23)             143     |  108     |  10    		Ca++ --      Ca 8.6                ---------------------------------( 95    		Mg 2.6                3.9     |  20<L>   |  0.79  			Ph 2.7           Cardiac Markers (07-20 @ 06:13)     Trop: -- -- / CKMB: -- / CK: 219  Cardiac Markers (07-19 @ 07:23)     Trop: -- -- / CKMB: -- / CK: 342      ------------------------------------------------------------------------------------------  RADIOLOGICAL FINDINGS REVIEW:      CT CERVICAL SPINE    IMPRESSION:  C2 left transverse process fracture extending through the   transverse foramen with a mildly depressed superior fracture fragment   impinging upon the transverse foramen. CT angiography or MR angiography   is advised to exclude injury to the vertebral artery.    Small ossific fragment within the C2-C3 left neural foramen likely   arising from the C2 inferior articulating facet.     CT LUMBAR SPINE  FINDINGS:    VERTEBRAL BODIES AND DISCS:  The vertebral bodies are normal in height.   Discogenic endplate changes are seen at the L1-L2 through L3-L4 levels   with marginal osteophyte formation. No fracture is seen.  ALIGNMENT:  There is scoliosis of the lumbar spine convex to the left   with the apex at the L2 level.  L1-L2 LEVEL:  No significant disc bulge or focal disc herniation.  L2-L3 LEVEL:  No significant disc bulge or focal disc herniation.  L3-L4 LEVEL:  No significant disc bulge or focal disc herniation. Mild   facet arthrosis  L4-L5 LEVEL:  No significant disc bulge or focal disc herniation.  L5-S1 LEVEL:  No significant disc bulge or focal disc herniation. Mild   facet arthrosis.  SPINAL CANAL:  No other soft tissue defect identified.   MISCELLANEOUS:  None.    IMPRESSION:  No fracture or subluxation.    Multilevel spondylosis.    Scoliosis convex to the left.      CT CHEST ABDOMEN PELVIS  IMPRESSION: No thoracic, abdominal, or pelvic sequela of trauma.      List Injuries Identified to Date:  C2 left transverse process fracture  Hypokalemia: Hypokalemia  Traumatic rhabdomyolysis, initial encounter: Traumatic rhabdomyolysis, initial encounter  Prerenal azotemia: Prerenal azotemia  CVA, old, hemiparesis: CVA, old, hemiparesis  Nutrition, metabolism, and development symptoms: Nutrition, metabolism, and development symptoms  Prophylactic measure: Prophylactic measure  Depression, unspecified depression type: Depression, unspecified depression type  Essential hypertension: Essential hypertension  Closed nondisplaced fracture of second cervical vertebra, unspecified fracture morphology, initial encounter: Closed nondisplaced fracture of second cervical vertebra, unspecified fracture morphology, initial encounter  Fall, initial encounter: Fall, initial encounter      List Operative and Interventional Radiological Procedures: None      Consults (Date):  [x] Neurosurgery (7/18/17)  [] Orthopedic Surgery  [] Spine Surgery  [] Plastic Surgery  [] ENT  [] Urology  [] PM&R  [] Social Work    INTERPRETATION/ASSESSMENT:   67y female s/p fall recovering.    PLAN:   - X-ray of Right humerus and R forearm  - Orthostatic blood pressure check  - follow up with neurosurgery in 4 weeks as per recommendation  - care per primary team TERTIARY TRAUMA SURVEY  ------------------------------------------------------------------------------------    Date of TTS: 7/20/17  Time: 15:00  Admit Date: 7/18/17  Admit GCS: E-4     V-5     M-6     HPI:  66 y/o F w/ PMHx of depression, CVA with residual L sided weakness, HTN and HLD, here 3 days prior 2/2 mechanical fall from tripped on a cord c/b R eye ecchymosis presents s/p 2nd fall down stairs (about 5 steps high) hitting the R side of her head found to have a C2 fracture in ED.   Patient reports having a fall about 3 days ago. Patient reports waking up in the middle of the night to go to the bathroom. They have been remodeling their house, so on the way to the bathroom in the dark, she tripped on a power cord on the floor. She landed on her R side and hit her face causing R eye ecchymosis. She went to the ED and they did a CT head and R sided xrays showing no acute fractures.   Last night, patient reports falling asleep downstairs and then waking up around 3am and heading upstairs. On her way up, she was carrying a box and several papers and fell down after ascending about 5 steps. She doesn't remember the events too well, but thinks she might have been a little dizzy when she fell and feeling very tired.   Denies any CP, palpations, SOB, F/C, N/V, syncope, or URI symptoms.     In ED,   Vitals T 97.7, HR 64, /75, RR 18, SpO2 94 on RA   Labs- WBC 11.5, K 3.2, Hgb 11.3,   Given KCl, 1 gr IV tylenol, Morphine 4mg IV, 500cc NS (18 Jul 2017 00:46)      INTERVAL EVENTS: No acute events. Patient is doing well, just concerned about the bruise on the right side of her face enlarging and changing colors. She is also complaining about dizziness when standing.    PAST MEDICAL & SURGICAL HISTORY:  Hypertension  Depression  Anxiety  History of appendectomy      FAMILY HISTORY:  No pertinent family history in first degree relatives    ALLERGIES: No Known Allergies    CURRENT MEDICATIONS  MEDICATIONS (STANDING): enoxaparin Injectable 40 milliGRAM(s) SubCutaneous daily  PARoxetine 30 milliGRAM(s) Oral daily  ARIPiprazole 2 milliGRAM(s) Oral daily  busPIRone 30 milliGRAM(s) Oral two times a day  doxepin 150 milliGRAM(s) Oral daily  aspirin enteric coated 81 milliGRAM(s) Oral daily  atorvastatin 20 milliGRAM(s) Oral at bedtime    MEDICATIONS (PRN):oxyCODONE    IR 5 milliGRAM(s) Oral every 4 hours PRN Moderate Pain (4 - 6)  oxyCODONE    IR 10 milliGRAM(s) Oral every 6 hours PRN Severe Pain (7 - 10)  acetaminophen   Tablet. 650 milliGRAM(s) Oral every 6 hours PRN Mild Pain (1 - 3)    -----------------------------------------------------------------------------------    VITAL SIGNS:  T(C): 36.8 (07-20-17 @ 12:49), Max: 36.8 (07-20-17 @ 00:16)  HR: 64 (07-20-17 @ 12:49) (60 - 71)  BP: 144/84 (07-20-17 @ 12:49) (101/67 - 144/84)  BP(mean): --  RR: 17 (07-20-17 @ 12:49) (17 - 18)  SpO2: 98% (07-20-17 @ 12:49) (96% - 98%)  Wt(kg): --  CAPILLARY BLOOD GLUCOSE        Drug Dosing Weight  Height (cm): 167.64 (18 Jul 2017 05:35)  Weight (kg): 78 (18 Jul 2017 05:35)  BMI (kg/m2): 27.8 (18 Jul 2017 05:35)  BSA (m2): 1.88 (18 Jul 2017 05:35)    07-19 @ 07:01  -  07-20 @ 07:00  --------------------------------------------------------  IN:    Oral Fluid: 834 mL  Total IN: 834 mL    OUT:    Voided: 1200 mL  Total OUT: 1200 mL    Total NET: -366 mL      07-20 @ 07:01  -  07-20 @ 14:50  --------------------------------------------------------  IN:    Oral Fluid: 600 mL  Total IN: 600 mL    OUT:    Voided: 260 mL  Total OUT: 260 mL    Total NET: 340 mL          PHYSICAL EXAM:   General: NAD, Resting in bed comfortably, not irritable   HEENT: small elevated area on the parietal bone about 4-6 cm from midline, EOMI  Face: Ecchymosis extending from zygomatic bone to hairline on R side of face.  Neck: C-collar on  Neuro: CN II-XII grossly intact  Cardio: RRR, nml S1/S2  Resp: Good effort, CTA b/l  Thorax: No chest wall tenderness  GI/Abd: Soft, NT/ND, no rebound/guarding, no masses palpated  Extremities: warm, brisk cap refill, tenderness over Right upper and lower arm, no tenderness on Left arm or lower extremities.  Skin: Intact, no breakdown  Musculoskeletal: All 4 extremities moving spontaneously, no limitations  Back: No midline tenderness    LABS:  CBC (07-19 @ 07:20)                              10.8<L>                         7.73    )----------------(  387        --    % Neutrophils, --    % Lymphocytes, ANC: --                                  33.6<L>    BMP (07-20 @ 06:13)             140     |  106     |  9     		Ca++ --      Ca 8.9                ---------------------------------( 98    		Mg 2.6                4.6     |  20<L>   |  0.79  			Ph 3.0     BMP (07-19 @ 07:23)             143     |  108     |  10    		Ca++ --      Ca 8.6                ---------------------------------( 95    		Mg 2.6                3.9     |  20<L>   |  0.79  			Ph 2.7           Cardiac Markers (07-20 @ 06:13)     Trop: -- -- / CKMB: -- / CK: 219  Cardiac Markers (07-19 @ 07:23)     Trop: -- -- / CKMB: -- / CK: 342      ------------------------------------------------------------------------------------------  RADIOLOGICAL FINDINGS REVIEW:      CT CERVICAL SPINE    IMPRESSION:  C2 left transverse process fracture extending through the   transverse foramen with a mildly depressed superior fracture fragment   impinging upon the transverse foramen. CT angiography or MR angiography   is advised to exclude injury to the vertebral artery.    Small ossific fragment within the C2-C3 left neural foramen likely   arising from the C2 inferior articulating facet.     CT LUMBAR SPINE  FINDINGS:    VERTEBRAL BODIES AND DISCS:  The vertebral bodies are normal in height.   Discogenic endplate changes are seen at the L1-L2 through L3-L4 levels   with marginal osteophyte formation. No fracture is seen.  ALIGNMENT:  There is scoliosis of the lumbar spine convex to the left   with the apex at the L2 level.  L1-L2 LEVEL:  No significant disc bulge or focal disc herniation.  L2-L3 LEVEL:  No significant disc bulge or focal disc herniation.  L3-L4 LEVEL:  No significant disc bulge or focal disc herniation. Mild   facet arthrosis  L4-L5 LEVEL:  No significant disc bulge or focal disc herniation.  L5-S1 LEVEL:  No significant disc bulge or focal disc herniation. Mild   facet arthrosis.  SPINAL CANAL:  No other soft tissue defect identified.   MISCELLANEOUS:  None.    IMPRESSION:  No fracture or subluxation.    Multilevel spondylosis.    Scoliosis convex to the left.      CT CHEST ABDOMEN PELVIS  IMPRESSION: No thoracic, abdominal, or pelvic sequela of trauma.      List Injuries Identified to Date:  C2 left transverse process fracture  Hypokalemia: Hypokalemia  Traumatic rhabdomyolysis, initial encounter: Traumatic rhabdomyolysis, initial encounter  Prerenal azotemia: Prerenal azotemia  CVA, old, hemiparesis: CVA, old, hemiparesis  Nutrition, metabolism, and development symptoms: Nutrition, metabolism, and development symptoms  Prophylactic measure: Prophylactic measure  Depression, unspecified depression type: Depression, unspecified depression type  Essential hypertension: Essential hypertension  Closed nondisplaced fracture of second cervical vertebra, unspecified fracture morphology, initial encounter: Closed nondisplaced fracture of second cervical vertebra, unspecified fracture morphology, initial encounter  Fall, initial encounter: Fall, initial encounter      List Operative and Interventional Radiological Procedures: None      Consults (Date):  [x] Neurosurgery (7/18/17)  [] Orthopedic Surgery  [] Spine Surgery  [] Plastic Surgery  [] ENT  [] Urology  [] PM&R  [] Social Work    INTERPRETATION/ASSESSMENT:   67y female s/p fall recovering.    PLAN:   - X-ray of Right humerus and R forearm  - follow up with neurosurgery in 4 weeks as per recommendation  - care per primary team TERTIARY TRAUMA SURVEY  ------------------------------------------------------------------------------------    Date of TTS: 7/20/17  Time: 15:00  Admit Date: 7/18/17  Admit GCS: E-4     V-5     M-6     HPI:  66 y/o F w/ PMHx of depression, CVA with residual L sided weakness, HTN and HLD, here 3 days prior 2/2 mechanical fall from tripped on a cord c/b R eye ecchymosis presents s/p 2nd fall down stairs (about 5 steps high) hitting the R side of her head found to have a C2 fracture in ED.   Patient reports having a fall about 3 days ago. Patient reports waking up in the middle of the night to go to the bathroom. They have been remodeling their house, so on the way to the bathroom in the dark, she tripped on a power cord on the floor. She landed on her R side and hit her face causing R eye ecchymosis. She went to the ED and they did a CT head and R sided xrays showing no acute fractures.   Last night, patient reports falling asleep downstairs and then waking up around 3am and heading upstairs. On her way up, she was carrying a box and several papers and fell down after ascending about 5 steps. She doesn't remember the events too well, but thinks she might have been a little dizzy when she fell and feeling very tired.   Denies any CP, palpations, SOB, F/C, N/V, syncope, or URI symptoms.     In ED,   Vitals T 97.7, HR 64, /75, RR 18, SpO2 94 on RA   Labs- WBC 11.5, K 3.2, Hgb 11.3,   Given KCl, 1 gr IV tylenol, Morphine 4mg IV, 500cc NS (18 Jul 2017 00:46)      INTERVAL EVENTS: No acute events. Patient is doing well, just concerned about the bruise on the right side of her face enlarging and changing colors. She is also complaining about dizziness when standing.    PAST MEDICAL & SURGICAL HISTORY:  Hypertension  Depression  Anxiety  History of appendectomy      FAMILY HISTORY:  No pertinent family history in first degree relatives    ALLERGIES: No Known Allergies    CURRENT MEDICATIONS  MEDICATIONS (STANDING): enoxaparin Injectable 40 milliGRAM(s) SubCutaneous daily  PARoxetine 30 milliGRAM(s) Oral daily  ARIPiprazole 2 milliGRAM(s) Oral daily  busPIRone 30 milliGRAM(s) Oral two times a day  doxepin 150 milliGRAM(s) Oral daily  aspirin enteric coated 81 milliGRAM(s) Oral daily  atorvastatin 20 milliGRAM(s) Oral at bedtime    MEDICATIONS (PRN):oxyCODONE    IR 5 milliGRAM(s) Oral every 4 hours PRN Moderate Pain (4 - 6)  oxyCODONE    IR 10 milliGRAM(s) Oral every 6 hours PRN Severe Pain (7 - 10)  acetaminophen   Tablet. 650 milliGRAM(s) Oral every 6 hours PRN Mild Pain (1 - 3)    -----------------------------------------------------------------------------------    VITAL SIGNS:  T(C): 36.8 (07-20-17 @ 12:49), Max: 36.8 (07-20-17 @ 00:16)  HR: 64 (07-20-17 @ 12:49) (60 - 71)  BP: 144/84 (07-20-17 @ 12:49) (101/67 - 144/84)  BP(mean): --  RR: 17 (07-20-17 @ 12:49) (17 - 18)  SpO2: 98% (07-20-17 @ 12:49) (96% - 98%)  Wt(kg): --  CAPILLARY BLOOD GLUCOSE        Drug Dosing Weight  Height (cm): 167.64 (18 Jul 2017 05:35)  Weight (kg): 78 (18 Jul 2017 05:35)  BMI (kg/m2): 27.8 (18 Jul 2017 05:35)  BSA (m2): 1.88 (18 Jul 2017 05:35)    07-19 @ 07:01  -  07-20 @ 07:00  --------------------------------------------------------  IN:    Oral Fluid: 834 mL  Total IN: 834 mL    OUT:    Voided: 1200 mL  Total OUT: 1200 mL    Total NET: -366 mL      07-20 @ 07:01  -  07-20 @ 14:50  --------------------------------------------------------  IN:    Oral Fluid: 600 mL  Total IN: 600 mL    OUT:    Voided: 260 mL  Total OUT: 260 mL    Total NET: 340 mL          PHYSICAL EXAM:   General: NAD, Resting in bed comfortably, not irritable   HEENT: small elevated area on the parietal bone about 4-6 cm from midline, EOMI  Face: Ecchymosis extending from zygomatic bone to hairline on R side of face.  Neck: C-collar on  Neuro: CN II-XII grossly intact  Cardio: RRR, nml S1/S2  Resp: Good effort, CTA b/l  Thorax: No chest wall tenderness  GI/Abd: Soft, NT/ND, no rebound/guarding, no masses palpated  Extremities: warm, brisk cap refill, tenderness over Right upper and lower arm, no tenderness on Left arm or lower extremities.  Skin: Intact, no breakdown  Musculoskeletal: All 4 extremities moving spontaneously, no limitations  Back: No midline tenderness    LABS:  CBC (07-19 @ 07:20)                              10.8<L>                         7.73    )----------------(  387        --    % Neutrophils, --    % Lymphocytes, ANC: --                                  33.6<L>    BMP (07-20 @ 06:13)             140     |  106     |  9     		Ca++ --      Ca 8.9                ---------------------------------( 98    		Mg 2.6                4.6     |  20<L>   |  0.79  			Ph 3.0     BMP (07-19 @ 07:23)             143     |  108     |  10    		Ca++ --      Ca 8.6                ---------------------------------( 95    		Mg 2.6                3.9     |  20<L>   |  0.79  			Ph 2.7           Cardiac Markers (07-20 @ 06:13)     Trop: -- -- / CKMB: -- / CK: 219  Cardiac Markers (07-19 @ 07:23)     Trop: -- -- / CKMB: -- / CK: 342      ------------------------------------------------------------------------------------------  RADIOLOGICAL FINDINGS REVIEW:      CT CERVICAL SPINE    IMPRESSION:  C2 left transverse process fracture extending through the   transverse foramen with a mildly depressed superior fracture fragment   impinging upon the transverse foramen. CT angiography or MR angiography   is advised to exclude injury to the vertebral artery.    Small ossific fragment within the C2-C3 left neural foramen likely   arising from the C2 inferior articulating facet.     CT LUMBAR SPINE  FINDINGS:    VERTEBRAL BODIES AND DISCS:  The vertebral bodies are normal in height.   Discogenic endplate changes are seen at the L1-L2 through L3-L4 levels   with marginal osteophyte formation. No fracture is seen.  ALIGNMENT:  There is scoliosis of the lumbar spine convex to the left   with the apex at the L2 level.  L1-L2 LEVEL:  No significant disc bulge or focal disc herniation.  L2-L3 LEVEL:  No significant disc bulge or focal disc herniation.  L3-L4 LEVEL:  No significant disc bulge or focal disc herniation. Mild   facet arthrosis  L4-L5 LEVEL:  No significant disc bulge or focal disc herniation.  L5-S1 LEVEL:  No significant disc bulge or focal disc herniation. Mild   facet arthrosis.  SPINAL CANAL:  No other soft tissue defect identified.   MISCELLANEOUS:  None.    IMPRESSION:  No fracture or subluxation.    Multilevel spondylosis.    Scoliosis convex to the left.      CT CHEST ABDOMEN PELVIS  IMPRESSION: No thoracic, abdominal, or pelvic sequela of trauma.      List Injuries Identified to Date:  C2 left transverse process fracture  Hypokalemia: Hypokalemia  Traumatic rhabdomyolysis, initial encounter: Traumatic rhabdomyolysis, initial encounter  Prerenal azotemia: Prerenal azotemia  CVA, old, hemiparesis: CVA, old, hemiparesis  Nutrition, metabolism, and development symptoms: Nutrition, metabolism, and development symptoms  Prophylactic measure: Prophylactic measure  Depression, unspecified depression type: Depression, unspecified depression type  Essential hypertension: Essential hypertension  Closed nondisplaced fracture of second cervical vertebra, unspecified fracture morphology, initial encounter: Closed nondisplaced fracture of second cervical vertebra, unspecified fracture morphology, initial encounter  Fall, initial encounter: Fall, initial encounter      List Operative and Interventional Radiological Procedures: None      Consults (Date):  [x] Neurosurgery (7/18/17)  [] Orthopedic Surgery  [] Spine Surgery  [] Plastic Surgery  [] ENT  [] Urology  [] PM&R  [] Social Work    INTERPRETATION/ASSESSMENT:   67y female s/p fall recovering.    PLAN:   - X-ray of Right humerus and elbow on 7/15 were normal, no further follow up.  - follow up with neurosurgery in 4 weeks as per recommendation  - care per primary team

## 2017-07-20 NOTE — PROGRESS NOTE ADULT - PROBLEM SELECTOR PLAN 3
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

## 2017-07-20 NOTE — PROGRESS NOTE ADULT - PROBLEM SELECTOR PLAN 6
- s/p IVF, CPK trended down - hx of depression and anxiety   - c/w home meds  - Abilify, Paxil, Doxepin, and Buspirone

## 2017-07-21 VITALS
SYSTOLIC BLOOD PRESSURE: 136 MMHG | OXYGEN SATURATION: 97 % | DIASTOLIC BLOOD PRESSURE: 84 MMHG | RESPIRATION RATE: 18 BRPM | TEMPERATURE: 98 F | HEART RATE: 69 BPM

## 2017-07-21 DIAGNOSIS — F07.81 POSTCONCUSSIONAL SYNDROME: ICD-10-CM

## 2017-07-21 PROCEDURE — 84100 ASSAY OF PHOSPHORUS: CPT

## 2017-07-21 PROCEDURE — 70498 CT ANGIOGRAPHY NECK: CPT

## 2017-07-21 PROCEDURE — 84295 ASSAY OF SERUM SODIUM: CPT

## 2017-07-21 PROCEDURE — 81001 URINALYSIS AUTO W/SCOPE: CPT

## 2017-07-21 PROCEDURE — 82947 ASSAY GLUCOSE BLOOD QUANT: CPT

## 2017-07-21 PROCEDURE — 82803 BLOOD GASES ANY COMBINATION: CPT

## 2017-07-21 PROCEDURE — 96374 THER/PROPH/DIAG INJ IV PUSH: CPT | Mod: XU

## 2017-07-21 PROCEDURE — 85730 THROMBOPLASTIN TIME PARTIAL: CPT

## 2017-07-21 PROCEDURE — 73080 X-RAY EXAM OF ELBOW: CPT

## 2017-07-21 PROCEDURE — 71250 CT THORAX DX C-: CPT

## 2017-07-21 PROCEDURE — 97116 GAIT TRAINING THERAPY: CPT

## 2017-07-21 PROCEDURE — 80048 BASIC METABOLIC PNL TOTAL CA: CPT

## 2017-07-21 PROCEDURE — 36000 PLACE NEEDLE IN VEIN: CPT | Mod: RT,XU

## 2017-07-21 PROCEDURE — 70486 CT MAXILLOFACIAL W/O DYE: CPT

## 2017-07-21 PROCEDURE — 73502 X-RAY EXAM HIP UNI 2-3 VIEWS: CPT

## 2017-07-21 PROCEDURE — 83605 ASSAY OF LACTIC ACID: CPT

## 2017-07-21 PROCEDURE — 99239 HOSP IP/OBS DSCHRG MGMT >30: CPT

## 2017-07-21 PROCEDURE — 82330 ASSAY OF CALCIUM: CPT

## 2017-07-21 PROCEDURE — 74176 CT ABD & PELVIS W/O CONTRAST: CPT

## 2017-07-21 PROCEDURE — 97162 PT EVAL MOD COMPLEX 30 MIN: CPT

## 2017-07-21 PROCEDURE — 99285 EMERGENCY DEPT VISIT HI MDM: CPT | Mod: 25

## 2017-07-21 PROCEDURE — 70450 CT HEAD/BRAIN W/O DYE: CPT

## 2017-07-21 PROCEDURE — 73060 X-RAY EXAM OF HUMERUS: CPT

## 2017-07-21 PROCEDURE — 85014 HEMATOCRIT: CPT

## 2017-07-21 PROCEDURE — 85610 PROTHROMBIN TIME: CPT

## 2017-07-21 PROCEDURE — 82435 ASSAY OF BLOOD CHLORIDE: CPT

## 2017-07-21 PROCEDURE — 71045 X-RAY EXAM CHEST 1 VIEW: CPT

## 2017-07-21 PROCEDURE — 93005 ELECTROCARDIOGRAM TRACING: CPT

## 2017-07-21 PROCEDURE — 82550 ASSAY OF CK (CPK): CPT

## 2017-07-21 PROCEDURE — 84484 ASSAY OF TROPONIN QUANT: CPT

## 2017-07-21 PROCEDURE — 99284 EMERGENCY DEPT VISIT MOD MDM: CPT | Mod: 25

## 2017-07-21 PROCEDURE — 82553 CREATINE MB FRACTION: CPT

## 2017-07-21 PROCEDURE — 80053 COMPREHEN METABOLIC PANEL: CPT

## 2017-07-21 PROCEDURE — 97530 THERAPEUTIC ACTIVITIES: CPT

## 2017-07-21 PROCEDURE — 72131 CT LUMBAR SPINE W/O DYE: CPT

## 2017-07-21 PROCEDURE — 72125 CT NECK SPINE W/O DYE: CPT

## 2017-07-21 PROCEDURE — 70551 MRI BRAIN STEM W/O DYE: CPT

## 2017-07-21 PROCEDURE — 83735 ASSAY OF MAGNESIUM: CPT

## 2017-07-21 PROCEDURE — 84132 ASSAY OF SERUM POTASSIUM: CPT

## 2017-07-21 PROCEDURE — 85027 COMPLETE CBC AUTOMATED: CPT

## 2017-07-21 PROCEDURE — 72170 X-RAY EXAM OF PELVIS: CPT

## 2017-07-21 RX ADMIN — ARIPIPRAZOLE 2 MILLIGRAM(S): 15 TABLET ORAL at 13:18

## 2017-07-21 RX ADMIN — OXYCODONE HYDROCHLORIDE 5 MILLIGRAM(S): 5 TABLET ORAL at 05:40

## 2017-07-21 RX ADMIN — ENOXAPARIN SODIUM 40 MILLIGRAM(S): 100 INJECTION SUBCUTANEOUS at 13:18

## 2017-07-21 RX ADMIN — OXYCODONE HYDROCHLORIDE 5 MILLIGRAM(S): 5 TABLET ORAL at 06:35

## 2017-07-21 RX ADMIN — Medication 81 MILLIGRAM(S): at 13:18

## 2017-07-21 RX ADMIN — Medication 30 MILLIGRAM(S): at 05:41

## 2017-07-21 RX ADMIN — Medication 150 MILLIGRAM(S): at 13:19

## 2017-07-21 RX ADMIN — OXYCODONE HYDROCHLORIDE 5 MILLIGRAM(S): 5 TABLET ORAL at 13:26

## 2017-07-21 RX ADMIN — OXYCODONE HYDROCHLORIDE 5 MILLIGRAM(S): 5 TABLET ORAL at 14:26

## 2017-07-21 RX ADMIN — Medication 30 MILLIGRAM(S): at 13:19

## 2017-07-21 NOTE — PROGRESS NOTE ADULT - PROBLEM SELECTOR PLAN 2
- Fall on stairs likely multifactorial given chronic L sided weakness, late hour, carrying objects, and large amount of current medications. Denies LOC.   - PT evaluation completed and candidate for [facility]. Spoke with  and plan to prepare for d/c to rehab

## 2017-07-21 NOTE — PROGRESS NOTE ADULT - SUBJECTIVE AND OBJECTIVE BOX
Patient is a 67y old  Female who presents with a chief complaint of Mechanical fall (18 Jul 2017 13:30)      SUBJECTIVE / OVERNIGHT EVENTS: Pt feels better, less dizziness and headache. Pt does report some upper back pain.     MEDICATIONS  (STANDING):  enoxaparin Injectable 40 milliGRAM(s) SubCutaneous daily  PARoxetine 30 milliGRAM(s) Oral daily  ARIPiprazole 2 milliGRAM(s) Oral daily  busPIRone 30 milliGRAM(s) Oral two times a day  doxepin 150 milliGRAM(s) Oral daily  aspirin enteric coated 81 milliGRAM(s) Oral daily  atorvastatin 20 milliGRAM(s) Oral at bedtime    MEDICATIONS  (PRN):  oxyCODONE    IR 5 milliGRAM(s) Oral every 4 hours PRN Moderate Pain (4 - 6)  oxyCODONE    IR 10 milliGRAM(s) Oral every 6 hours PRN Severe Pain (7 - 10)  acetaminophen   Tablet. 650 milliGRAM(s) Oral every 6 hours PRN Mild Pain (1 - 3)        CAPILLARY BLOOD GLUCOSE        I&O's Summary    20 Jul 2017 07:01  -  21 Jul 2017 07:00  --------------------------------------------------------  IN: 1020 mL / OUT: 2410 mL / NET: -1390 mL    21 Jul 2017 07:01  -  21 Jul 2017 15:18  --------------------------------------------------------  IN: 600 mL / OUT: 0 mL / NET: 600 mL        PHYSICAL EXAM:  GENERAL: NAD, well-developed  HEAD:  Atraumatic, Normocephalic  EYES: EOMI, PERRLA, conjunctiva and sclera clear  NECK: C-collar, No JVD  CHEST/LUNG: Clear to auscultation bilaterally; No wheeze  HEART: Regular rate and rhythm; No murmurs, rubs, or gallops  ABDOMEN: Soft, Nontender, Nondistended; Bowel sounds present  EXTREMITIES:  2+ Peripheral Pulses, No clubbing, cyanosis, or edema  PSYCH: AAOx3  NEUROLOGY: CN's intact, strength 5/5 x 4.   SKIN: No rashes or lesions. Ecchymosis around right eye.     LABS:    07-20    140  |  106  |  9   ----------------------------<  98  4.6   |  20<L>  |  0.79    Ca    8.9      20 Jul 2017 06:13  Phos  3.0     07-20  Mg     2.6     07-20        CARDIAC MARKERS ( 20 Jul 2017 06:13 )  x     / x     / 219 U/L / x     / x              RADIOLOGY & ADDITIONAL TESTS:    Imaging Personally Reviewed:    Consultant(s) Notes Reviewed:      Care Discussed with Consultants/Other Providers: Patient is a 67y old  Female who presents with a chief complaint of Mechanical fall (18 Jul 2017 13:30)      SUBJECTIVE / OVERNIGHT EVENTS: Pt feels better, less dizziness and headache. Pt does report some upper back pain.     MEDICATIONS  (STANDING):  enoxaparin Injectable 40 milliGRAM(s) SubCutaneous daily  PARoxetine 30 milliGRAM(s) Oral daily  ARIPiprazole 2 milliGRAM(s) Oral daily  busPIRone 30 milliGRAM(s) Oral two times a day  doxepin 150 milliGRAM(s) Oral daily  aspirin enteric coated 81 milliGRAM(s) Oral daily  atorvastatin 20 milliGRAM(s) Oral at bedtime    MEDICATIONS  (PRN):  oxyCODONE    IR 5 milliGRAM(s) Oral every 4 hours PRN Moderate Pain (4 - 6)  oxyCODONE    IR 10 milliGRAM(s) Oral every 6 hours PRN Severe Pain (7 - 10)  acetaminophen   Tablet. 650 milliGRAM(s) Oral every 6 hours PRN Mild Pain (1 - 3)        CAPILLARY BLOOD GLUCOSE        I&O's Summary    20 Jul 2017 07:01  -  21 Jul 2017 07:00  --------------------------------------------------------  IN: 1020 mL / OUT: 2410 mL / NET: -1390 mL    21 Jul 2017 07:01  -  21 Jul 2017 15:18  --------------------------------------------------------  IN: 600 mL / OUT: 0 mL / NET: 600 mL    Vital Signs Last 24 Hrs  T(C): 36.9 (21 Jul 2017 12:35), Max: 36.9 (21 Jul 2017 12:35)  T(F): 98.5 (21 Jul 2017 12:35), Max: 98.5 (21 Jul 2017 12:35)  HR: 69 (21 Jul 2017 12:35) (61 - 69)  BP: 136/84 (21 Jul 2017 12:35) (136/84 - 164/87)  BP(mean): --  RR: 18 (21 Jul 2017 12:35) (18 - 18)  SpO2: 97% (21 Jul 2017 12:35) (95% - 97%)    PHYSICAL EXAM:  GENERAL: NAD, well-developed  HEAD:  Atraumatic, Normocephalic  EYES: EOMI, PERRLA, conjunctiva and sclera clear  NECK: C-collar, No JVD  CHEST/LUNG: Clear to auscultation bilaterally; No wheeze  HEART: Regular rate and rhythm; No murmurs, rubs, or gallops  ABDOMEN: Soft, Nontender, Nondistended; Bowel sounds present  EXTREMITIES:  2+ Peripheral Pulses, No clubbing, cyanosis, or edema  PSYCH: AAOx3  NEUROLOGY: CN's intact, strength 5/5 x 4.   SKIN: No rashes or lesions. Ecchymosis around right eye.     LABS:    07-20    140  |  106  |  9   ----------------------------<  98  4.6   |  20<L>  |  0.79    Ca    8.9      20 Jul 2017 06:13  Phos  3.0     07-20  Mg     2.6     07-20        CARDIAC MARKERS ( 20 Jul 2017 06:13 )  x     / x     / 219 U/L / x     / x              RADIOLOGY & ADDITIONAL TESTS:    Imaging Personally Reviewed:    Consultant(s) Notes Reviewed:      Care Discussed with Consultants/Other Providers:

## 2017-07-21 NOTE — PROGRESS NOTE ADULT - PROBLEM SELECTOR PLAN 5
- persistent headache and dizziness likely due to post concussion syndrome   - orthostatics negative  - MRI negative for acute pathology

## 2017-07-21 NOTE — PROGRESS NOTE ADULT - PROBLEM SELECTOR PLAN 1
- Seen on CT neck w/ CTA neg for bleed, Assessed by Trauma and Neurosurgery  - C-collar for 4 weeks as per neurosurgery. She should follow up as an outpatient with Dr. Sai Liang from neurosurgery.   - no neurological deficits on exam. c/w current pain regimen  - PT

## 2017-07-21 NOTE — PROGRESS NOTE ADULT - PROBLEM SELECTOR PLAN 3
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 No acute hemorrhage or infarct.

## 2017-07-21 NOTE — PROGRESS NOTE ADULT - ASSESSMENT
66 y/o F w/ PMHx of depression, CVA with residual L sided weakness, HTN and HLD, here 3 days prior 2/2 mechanical fall from tripped on a cord c/b R eye ecchymosis presents s/p 2nd fall down stairs (about 5 steps high) hitting the R side of her head found to have a stable C2 fracture now pending rehab.

## 2017-07-21 NOTE — PROGRESS NOTE ADULT - PROBLEM SELECTOR PLAN 4
- continue to hold ace-i given relative hypotension. Patient will need to be reevaluated at rehab/outpatient to restart

## 2017-07-21 NOTE — PROGRESS NOTE ADULT - PROBLEM SELECTOR PLAN 8
c/w lovenox sq    D/C to rehab, d/c time 36 minutes c/w lovenox sq    D/C to rehab, d/c time 34 minutes

## 2017-08-30 ENCOUNTER — APPOINTMENT (OUTPATIENT)
Dept: NEUROSURGERY | Facility: CLINIC | Age: 67
End: 2017-08-30
Payer: MEDICARE

## 2017-08-30 PROCEDURE — 99213 OFFICE O/P EST LOW 20 MIN: CPT

## 2020-06-26 NOTE — ED PROVIDER NOTE - NS ED ROS FT
ROS: GENERAL: no fevers, no chills HEENT: no epistaxis, no eye pain, no ear pain, no throat pain, +facial pain CARDIAC: no chest pain, no shortness of breath PULM: no cough, no shortness of breath GI: no nausea, no vomiting, no diarrhea, no abdominal pain, no hematemesis, no bright red blood per rectum : no dysuria, no hematuria EXTREMITIES: no arm pain, no leg pain, + back pain, + left thoracic pain SKIN: no purpura, no petechiae NEURO: +dizziness, no headache, no neck pain, no loss of strength/sensation HEME: no easy bruising, no easy bleeding ROS: GENERAL: no fevers, no chills HEENT: no epistaxis, no eye pain, no ear pain, no throat pain, +facial pain CARDIAC: no chest pain, no shortness of breath PULM: no cough, no shortness of breath GI: no nausea, no vomiting, no diarrhea, no abdominal pain, no hematemesis, no bright red blood per rectum : no dysuria, no hematuria EXTREMITIES: no arm pain, no leg pain, + back pain, + left thoracic pain SKIN: no purpura, no petechiae NEURO: +dizziness, no headache, no neck pain, no new loss of strength/sensation HEME: no easy bruising, no easy bleeding None

## 2021-07-16 ENCOUNTER — TRANSCRIPTION ENCOUNTER (OUTPATIENT)
Age: 71
End: 2021-07-16

## 2023-12-17 NOTE — ED PROCEDURE NOTE - GENERAL PROCEDURE NAME
Ultrasound guided peripheral vein catheterization denies father with bipolar disorder per charts pending bed placement spoke to ED

## 2024-08-05 NOTE — ED ADULT NURSE NOTE - WHEN FALL OCCURRED
this admission/last six months Quality 226: Preventive Care And Screening: Tobacco Use: Screening And Cessation Intervention: Patient screened for tobacco use and is an ex/non-smoker Detail Level: Detailed

## 2025-07-17 NOTE — PROGRESS NOTE ADULT - PROBLEM/PLAN-4
Addended by: CLARISSA DAVEY on: 7/17/2025 03:43 PM     Modules accepted: Orders    
DISPLAY PLAN FREE TEXT